# Patient Record
Sex: FEMALE | Race: ASIAN | NOT HISPANIC OR LATINO | Employment: FULL TIME | URBAN - METROPOLITAN AREA
[De-identification: names, ages, dates, MRNs, and addresses within clinical notes are randomized per-mention and may not be internally consistent; named-entity substitution may affect disease eponyms.]

---

## 2023-02-01 ENCOUNTER — OFFICE VISIT (OUTPATIENT)
Dept: INTERNAL MEDICINE | Facility: CLINIC | Age: 33
End: 2023-02-01
Payer: COMMERCIAL

## 2023-02-01 VITALS
DIASTOLIC BLOOD PRESSURE: 67 MMHG | TEMPERATURE: 97.3 F | HEART RATE: 82 BPM | SYSTOLIC BLOOD PRESSURE: 110 MMHG | HEIGHT: 64 IN | OXYGEN SATURATION: 98 % | RESPIRATION RATE: 16 BRPM | WEIGHT: 188.7 LBS | BODY MASS INDEX: 32.21 KG/M2

## 2023-02-01 DIAGNOSIS — Z91.89 BREASTFEEDING PROBLEM: ICD-10-CM

## 2023-02-01 DIAGNOSIS — R22.32 AXILLARY LUMP, LEFT: Primary | ICD-10-CM

## 2023-02-01 PROCEDURE — 99203 OFFICE O/P NEW LOW 30 MIN: CPT | Performed by: INTERNAL MEDICINE

## 2023-02-01 RX ORDER — CEPHALEXIN 500 MG/1
500 CAPSULE ORAL 3 TIMES DAILY
Qty: 21 CAPSULE | Refills: 0 | Status: SHIPPED | OUTPATIENT
Start: 2023-02-01 | End: 2023-12-26

## 2023-02-01 NOTE — PROGRESS NOTES
"  Assessment & Plan     Axillary lump, left    - cephALEXin (KEFLEX) 500 MG capsule; Take 1 capsule (500 mg) by mouth 3 times daily    Breastfeeding problem    31 yo female, had her baby boy in July 2022, and was breastfeeding up to 2 weeks ago. She slowly decreased the frequency of pumping and now has very small milk production mostly from the left breast. No breast fullness, tension, redness or pain. She noticed tender painful lump in the left breast for about 10 days. No redness, drainage. She tried massage and warm compressed, but actually thinks that the lump is getting bigger and more tender. It is the size of the egg now. She does shave her armpit.  She donated the kidney in the past. Cr normal 0.74 in June 2022.    Home treatment discussed. She will take Cephalexin for 1 week. Follow-up and possible US if no improvement in 2 weeks.               BMI:   Estimated body mass index is 32.9 kg/m  as calculated from the following:    Height as of this encounter: 1.613 m (5' 3.5\").    Weight as of this encounter: 85.6 kg (188 lb 11.2 oz).           Return in about 2 weeks (around 2/15/2023) for Follow up.    Cookie Urbina MD  [facility]    Verona Ureña is a 32 year old, presenting for the following health issues:  Pain (Having Armpit pain since stopping breastfeeding x.5 weeks Has developed a lump in L Armpit pain has worsened )      Pain    History of Present Illness       Reason for visit:  Under arm pain and lump present  Symptom onset:  1-2 weeks ago    She eats 2-3 servings of fruits and vegetables daily.She consumes 1 sweetened beverage(s) daily.She exercises with enough effort to increase her heart rate 10 to 19 minutes per day.  She exercises with enough effort to increase her heart rate 3 or less days per week.   She is taking medications regularly.             Review of Systems         Objective    /67   Pulse 82   Temp 97.3  F (36.3  C)   Resp 16   Ht " "1.613 m (5' 3.5\")   Wt 85.6 kg (188 lb 11.2 oz)   SpO2 98%   BMI 32.90 kg/m    Body mass index is 32.9 kg/m .  Physical Exam   Constitutional:  oriented to person, place, and time, appears well-nourished. No distress.   HENT:   Head: Normocephalic.   Eyes: Conjunctivae are normal.  Neck: Normal range of motion.   Pulmonary/Chest: Effort normal   Breast exam: no palpable masses and asymmetry, no redness or sign of mastitis. Palpable tender, egg size lump in the left axilla, no redness, warmness, drainage.  Musculoskeletal: Normal range of motion.   Neurological: alert and oriented to person, place, and time.   Skin: Skin is warm.   Psychiatric: normal mood and affect.                    "

## 2023-02-01 NOTE — PATIENT INSTRUCTIONS
You can try cool compresses, hot showers, gentle massage.    Rx for cephalexin is sent.    If not sreekanth in 7-10 days, we can do the US.

## 2023-02-02 ENCOUNTER — MEDICAL CORRESPONDENCE (OUTPATIENT)
Dept: HEALTH INFORMATION MANAGEMENT | Facility: CLINIC | Age: 33
End: 2023-02-02

## 2023-02-05 ENCOUNTER — HEALTH MAINTENANCE LETTER (OUTPATIENT)
Age: 33
End: 2023-02-05

## 2023-12-26 ENCOUNTER — OFFICE VISIT (OUTPATIENT)
Dept: INTERNAL MEDICINE | Facility: CLINIC | Age: 33
End: 2023-12-26
Payer: COMMERCIAL

## 2023-12-26 VITALS
BODY MASS INDEX: 33.03 KG/M2 | RESPIRATION RATE: 16 BRPM | TEMPERATURE: 97.1 F | HEIGHT: 63 IN | WEIGHT: 186.4 LBS | SYSTOLIC BLOOD PRESSURE: 116 MMHG | HEART RATE: 95 BPM | DIASTOLIC BLOOD PRESSURE: 68 MMHG | OXYGEN SATURATION: 98 %

## 2023-12-26 DIAGNOSIS — Z00.00 ANNUAL PHYSICAL EXAM: Primary | ICD-10-CM

## 2023-12-26 PROCEDURE — 86580 TB INTRADERMAL TEST: CPT | Performed by: INTERNAL MEDICINE

## 2023-12-26 PROCEDURE — 99395 PREV VISIT EST AGE 18-39: CPT | Performed by: INTERNAL MEDICINE

## 2023-12-26 ASSESSMENT — ENCOUNTER SYMPTOMS
ABDOMINAL PAIN: 0
ARTHRALGIAS: 0
WEAKNESS: 0
CONSTIPATION: 0
HEARTBURN: 0
FREQUENCY: 0
PALPITATIONS: 0
NERVOUS/ANXIOUS: 0
DIZZINESS: 0
HEMATURIA: 0
NAUSEA: 0
SHORTNESS OF BREATH: 0
CHILLS: 0
HEADACHES: 0
HEMATOCHEZIA: 0
COUGH: 0
PARESTHESIAS: 0
DYSURIA: 0
EYE PAIN: 0
DIARRHEA: 0
MYALGIAS: 0
SORE THROAT: 0
JOINT SWELLING: 0
FEVER: 0

## 2023-12-26 NOTE — PROGRESS NOTES
SUBJECTIVE:   Adelita is a 33 year old, presenting for the following:  Physical (Pap 22-Need's TB Screen )        2023    12:08 PM   Additional Questions   Roomed by Joseline       [email or web address]                Have you ever done Advance Care Planning? (For example, a Health Directive, POLST, or a discussion with a medical provider or your loved ones about your wishes): No, advance care planning information given to patient to review.  Patient plans to discuss their wishes with loved ones or provider.      Social History     Tobacco Use    Smoking status: Never     Passive exposure: Never    Smokeless tobacco: Never   Substance Use Topics    Alcohol use: Not on file             2023    12:06 PM   Alcohol Use   Prescreen: >3 drinks/day or >7 drinks/week? No     Reviewed orders with patient.  Reviewed health maintenance and updated orders accordingly - Yes      Breast Cancer Screenin/26/2023    12:07 PM   Breast CA Risk Assessment (FHS-7)   Do you have a family history of breast, colon, or ovarian cancer? No / Unknown           Pertinent mammograms are reviewed under the imaging tab.    History of abnormal Pap smear:      Reviewed and updated as needed this visit by clinical staff   Tobacco  Allergies  Meds              Reviewed and updated as needed this visit by Provider                       CONSTITUTIONAL: NEGATIVE for fever, chills, change in weight  INTEGUMENTARU/SKIN: NEGATIVE for worrisome rashes, moles or lesions  EYES: NEGATIVE for vision changes or irritation  ENT: NEGATIVE for ear, mouth and throat problems  RESP: NEGATIVE for significant cough or SOB  BREAST: NEGATIVE for masses, tenderness or discharge  CV: NEGATIVE for chest pain, palpitations or peripheral edema  GI: NEGATIVE for nausea, abdominal pain, heartburn, or change in bowel habits  : NEGATIVE for unusual urinary or vaginal symptoms. Periods are regular.  MUSCULOSKELETAL: NEGATIVE for significant arthralgias or  "myalgia  NEURO: NEGATIVE for weakness, dizziness or paresthesias  PSYCHIATRIC: NEGATIVE for changes in mood or affect     OBJECTIVE:   /68   Pulse 95   Temp 97.1  F (36.2  C)   Resp 16   Ht 1.599 m (5' 2.95\")   Wt 84.6 kg (186 lb 6.4 oz)   LMP 12/01/2023   SpO2 98%   BMI 33.07 kg/m      General Appearance: Alert, cooperative, no distress, appears stated age.  Head: Normocephalic, without obvious abnormality, atraumatic  Eyes: PERRL, conjunctiva/corneas clear, EOM's intact  Ears: Normal TM's and external ear canals, both ears  Nose: Nares normal, septum midline,mucosa normal, no drainage  Throat: Lips, mucosa, and tongue normal; teeth and gums normal  Neck: Supple, symmetrical, trachea midline, no adenopathy;  thyroid: not enlarged, symmetric, no tenderness/mass/nodules; no carotid bruit or JVD  Back: Symmetric, no curvature, ROM normal, no CVA tenderness.  Lungs: Clear to auscultation bilaterally, respirations unlabored.  Breasts: No breast masses, tenderness, asymmetry, or nipple discharge.  Heart: Regular rate and rhythm, S1 and S2 normal, no murmur, rub, or gallop.  Abdomen: Soft, non-tender, bowel sounds active all four quadrants,  no masses, no organomegaly.  Extremities: Extremities normal, atraumatic, no cyanosis or edema.  Skin: Skin color, texture, turgor normal, no rashes or lesions.  Lymph nodes: Cervical, supraclavicular, and axillary nodes normal.  Neurologic: No focal neurological findings.          ASSESSMENT/PLAN:   (Z00.00) Annual physical exam  (primary encounter diagnosis)  [facility] and needs 2 step Mantoux TB test.   She donated kidney to her father and has regular blood work at [facility] every year.          COUNSELING:  Reviewed preventive health counseling, as reflected in patient instructions       Regular exercise       Healthy diet/nutrition      BMI:   Estimated body mass index is 33.07 kg/m  as calculated from the following:    Height as " "of this encounter: 1.599 m (5' 2.95\").    Weight as of this encounter: 84.6 kg (186 lb 6.4 oz).         She reports that she has never smoked. She has never been exposed to tobacco smoke. She has never used smokeless tobacco.          Cookie Urbina MD    "

## 2023-12-28 ENCOUNTER — ALLIED HEALTH/NURSE VISIT (OUTPATIENT)
Dept: FAMILY MEDICINE | Facility: CLINIC | Age: 33
End: 2023-12-28
Payer: COMMERCIAL

## 2023-12-28 DIAGNOSIS — Z11.1 TUBERCULIN SKIN TEST READING ENCOUNTER: Primary | ICD-10-CM

## 2023-12-28 LAB
PPDINDURATION: 0 MM (ref 0–4.99)
PPDREDNESS: NORMAL

## 2023-12-28 PROCEDURE — 99207 PR NO CHARGE NURSE ONLY: CPT

## 2023-12-28 NOTE — PROGRESS NOTES
Patient is here today for a Mantoux (TST) test results.    Did patient return to clinic 48-72 hours from Mantoux (TST) placement: Yes -     PPD Induration   Date Value Ref Range Status   12/28/2023 0 0 - 4.99 mm Final     PPD Redness   Date Value Ref Range Status   12/28/2023 Not Present  Final       Induration Size? Induration <5mm - Enter results in Enter/Edit Activity. Route results to ordering provider.     Patient needs form signed? No    Patient reports having previously had the BCG Vaccine: No    Does patient need a two step?  Yes - placed order according to standing order or notified LP for need for next TST.  Instructed patient when to return to the clinic.

## 2024-01-02 ENCOUNTER — ALLIED HEALTH/NURSE VISIT (OUTPATIENT)
Dept: FAMILY MEDICINE | Facility: CLINIC | Age: 34
End: 2024-01-02
Payer: COMMERCIAL

## 2024-01-02 DIAGNOSIS — Z00.00 HEALTH CARE MAINTENANCE: Primary | ICD-10-CM

## 2024-01-02 PROCEDURE — 86580 TB INTRADERMAL TEST: CPT

## 2024-01-02 PROCEDURE — 99207 PR NO CHARGE NURSE ONLY: CPT

## 2024-01-02 NOTE — PROGRESS NOTES
Clinic Administered Medication Documentation      Injectable Medication Documentation    Is there an active order (written within the past 365 days, with administrations remaining, not ) in the chart? Yes.     Patient was given  mantoux . Prior to medication administration, verified patient's identity using patient s name and date of birth. Please see MAR and medication order for additional information. Patient instructed to remain in clinic for 15 minutes and report any adverse reaction to staff immediately.    Vial/Syringe: Multi dose vial  Was this medication supplied by the patient? No  Is this a medication the patient will need to receive again? No - not necessary to check for refills remaining.

## 2024-01-05 ENCOUNTER — ALLIED HEALTH/NURSE VISIT (OUTPATIENT)
Dept: FAMILY MEDICINE | Facility: CLINIC | Age: 34
End: 2024-01-05
Payer: COMMERCIAL

## 2024-01-05 DIAGNOSIS — Z11.1 TUBERCULIN SKIN TEST READING ENCOUNTER: Primary | ICD-10-CM

## 2024-01-05 LAB
PPDINDURATION: 0 MM (ref 0–4.99)
PPDREDNESS: NORMAL

## 2024-01-05 PROCEDURE — 99207 PR NO CHARGE NURSE ONLY: CPT

## 2024-01-05 NOTE — PROGRESS NOTES
Patient is here today for a Mantoux (TST) test results.    Did patient return to clinic 48-72 hours from Mantoux (TST) placement: Yes - placed on 1/2/24 at 2 PM. Seen today at 1/5/24 at 10 AM.     PPD Induration   Date Value Ref Range Status   01/05/2024 0 0 - 4.99 mm Final     PPD Redness   Date Value Ref Range Status   01/05/2024 Not Present  Final       Induration Size? Induration <5mm - Enter results in Enter/Edit Activity. Route results to ordering provider.     Patient needs form signed? No    Patient reports having previously had the BCG Vaccine: No    Does patient need a two step? No This is step two for patients.     Harry KRAMER RN

## 2024-01-06 ENCOUNTER — OFFICE VISIT (OUTPATIENT)
Dept: FAMILY MEDICINE | Facility: CLINIC | Age: 34
End: 2024-01-06
Payer: OTHER MISCELLANEOUS

## 2024-01-06 VITALS
TEMPERATURE: 98.3 F | SYSTOLIC BLOOD PRESSURE: 116 MMHG | DIASTOLIC BLOOD PRESSURE: 76 MMHG | RESPIRATION RATE: 14 BRPM | HEART RATE: 79 BPM | OXYGEN SATURATION: 99 %

## 2024-01-06 DIAGNOSIS — M25.562 ACUTE PAIN OF LEFT KNEE: Primary | ICD-10-CM

## 2024-01-06 DIAGNOSIS — N92.6 LATE MENSES: ICD-10-CM

## 2024-01-06 LAB — HCG UR QL: POSITIVE

## 2024-01-06 PROCEDURE — 81025 URINE PREGNANCY TEST: CPT | Performed by: FAMILY MEDICINE

## 2024-01-06 PROCEDURE — 99213 OFFICE O/P EST LOW 20 MIN: CPT | Performed by: FAMILY MEDICINE

## 2024-01-06 NOTE — PROGRESS NOTES
Assessment & Plan     Acute pain of left knee    - Ankle/Knee Bracing Supplies Order Knee Immobilizer; Left    Placed in LEFT knee immobilizer.  With +UPT- we opted to hold on xraying knee as patient can weight bear and ambulate at this time.  Suspect this is a ligament strain from the fall and shoud improve with time 1-2 weeks.  Continue with ICE and Tylenol and elevation prn.  If no change or worsening sx- recommend Follow-up in one week for recheck prn.  May return to work without restrictions- patient stated she did not need work note today.    Late menses    - HCG qualitative urine; Future  - HCG qualitative urine    +UPT today- advised Follow-up with OB to establish prenatal care.    Lore De La Cruz MD  [facility]    Verona Ureña is a 33 year old, presenting for the following health issues:  No chief complaint on file.      HPI     Was working at [employer] today and slipped on wet floor after floors were mopped and anti-slip mats were removed.  Fell back onto butt but heard LEFT knee pop.  Able to bear weight immediately after.    Late menses due at beginning of month- trying to get pregnant.            Review of Systems   Constitutional, HEENT, cardiovascular, pulmonary, GI, , musculoskeletal, neuro, skin, endocrine and psych systems are negative, except as otherwise noted.      Objective    LMP 12/01/2023   There is no height or weight on file to calculate BMI.  Physical Exam   GENERAL: healthy, alert and no distress  EYES: Eyes grossly normal to inspection, PERRL and conjunctivae and sclerae normal  MS: no gross musculoskeletal defects noted, LEFT knee with mild swelling anteriorly over anterior knee- able to flex and extend with pain located posterolaterally.  No pain along joint line.  Normal landmarks.  PSYCH: mentation appears normal, affect normal/bright    Results for orders placed or performed in visit on 01/06/24 (from the past 24 hour(s))   HCG  qualitative urine   Result Value Ref Range    hCG Urine Qualitative Positive (A) Negative

## 2024-01-08 ENCOUNTER — TELEPHONE (OUTPATIENT)
Dept: FAMILY MEDICINE | Facility: CLINIC | Age: 34
End: 2024-01-08

## 2024-01-08 ENCOUNTER — LAB (OUTPATIENT)
Dept: LAB | Facility: CLINIC | Age: 34
End: 2024-01-08
Payer: COMMERCIAL

## 2024-01-08 ENCOUNTER — MYC MEDICAL ADVICE (OUTPATIENT)
Dept: INTERNAL MEDICINE | Facility: CLINIC | Age: 34
End: 2024-01-08

## 2024-01-08 ENCOUNTER — TRANSCRIBE ORDERS (OUTPATIENT)
Dept: ULTRASOUND IMAGING | Facility: CLINIC | Age: 34
End: 2024-01-08

## 2024-01-08 ENCOUNTER — TELEPHONE (OUTPATIENT)
Dept: OBGYN | Facility: CLINIC | Age: 34
End: 2024-01-08
Payer: COMMERCIAL

## 2024-01-08 DIAGNOSIS — Z87.59 HISTORY OF ECTOPIC PREGNANCY: ICD-10-CM

## 2024-01-08 DIAGNOSIS — O26.90 PREGNANCY RELATED CONDITION, ANTEPARTUM: Primary | ICD-10-CM

## 2024-01-08 DIAGNOSIS — Z87.59 HISTORY OF ECTOPIC PREGNANCY: Primary | ICD-10-CM

## 2024-01-08 LAB — HCG INTACT+B SERPL-ACNC: 260 MIU/ML

## 2024-01-08 PROCEDURE — 36415 COLL VENOUS BLD VENIPUNCTURE: CPT

## 2024-01-08 PROCEDURE — 84702 CHORIONIC GONADOTROPIN TEST: CPT

## 2024-01-08 NOTE — TELEPHONE ENCOUNTER
[facility] Call Center    Phone Message    May a detailed message be left on voicemail: yes     Reason for Call: Other: Pt called this morning because she had a positive pregnancy test over the weekend and scheduled a initial prenatal appointment with Dr. Gonzalez. Patient states with her first pregnancy it was ectopic and she had an ultrasound at 6/7 weeks GA. Patient is wondering if that can be the same with this pregnancy. Please call Pt to discuss      Action Taken: Other: [facility] Family [facility]    Travel Screening: Not Applicable

## 2024-01-08 NOTE — TELEPHONE ENCOUNTER
[facility] Call Center    Phone Message    May a detailed message be left on voicemail: yes     Reason for Call: Other: Patient is calling stating she had an eptopic pregnancy in the past and with her last pregnancy she had an ultra sound done at  7 weeks and wondering if she can get that done early again with this pregnancy as well as some blood work? Please call the patient back to discuss. Thank you.      Action Taken: Other: obgyn    Travel Screening: Not Applicable         Pt new to clinic, per pt, hx of ectopic  Wondering if can do early ultrasound at 7 weeks (or unsure if want to do before to assess location?)  Also wants HCGs  Labs/ultrasound pended  Will be seeing Dr. Arce for initial OB  Routing to provider to advise.  Laurel Rasheed RN on 1/8/2024 at 9:13 AM

## 2024-01-09 PROBLEM — O36.80X0 PREGNANCY OF UNKNOWN ANATOMIC LOCATION: Status: ACTIVE | Noted: 2024-01-09

## 2024-01-09 NOTE — TELEPHONE ENCOUNTER
Yes we will order ultrasound to confirm the pregnancy around 6-7wk of GA  Looks like patient also made onel with OB and canceled my onel ??    Yessica Gonzalez MD

## 2024-01-10 ENCOUNTER — LAB (OUTPATIENT)
Dept: LAB | Facility: CLINIC | Age: 34
End: 2024-01-10
Payer: COMMERCIAL

## 2024-01-10 DIAGNOSIS — Z87.59 HISTORY OF ECTOPIC PREGNANCY: ICD-10-CM

## 2024-01-10 LAB — HCG INTACT+B SERPL-ACNC: 558 MIU/ML

## 2024-01-10 PROCEDURE — 36415 COLL VENOUS BLD VENIPUNCTURE: CPT

## 2024-01-10 PROCEDURE — 84702 CHORIONIC GONADOTROPIN TEST: CPT

## 2024-01-15 ENCOUNTER — ANCILLARY PROCEDURE (OUTPATIENT)
Dept: ULTRASOUND IMAGING | Facility: CLINIC | Age: 34
End: 2024-01-15
Payer: COMMERCIAL

## 2024-01-15 DIAGNOSIS — Z87.59 HISTORY OF ECTOPIC PREGNANCY: ICD-10-CM

## 2024-01-15 PROCEDURE — 76801 OB US < 14 WKS SINGLE FETUS: CPT | Performed by: OBSTETRICS & GYNECOLOGY

## 2024-01-15 PROCEDURE — 76817 TRANSVAGINAL US OBSTETRIC: CPT | Performed by: OBSTETRICS & GYNECOLOGY

## 2024-01-16 ENCOUNTER — VIRTUAL VISIT (OUTPATIENT)
Dept: OBGYN | Facility: CLINIC | Age: 34
End: 2024-01-16
Payer: COMMERCIAL

## 2024-01-16 ENCOUNTER — OFFICE VISIT (OUTPATIENT)
Dept: FAMILY MEDICINE | Facility: CLINIC | Age: 34
End: 2024-01-16
Payer: OTHER MISCELLANEOUS

## 2024-01-16 VITALS
DIASTOLIC BLOOD PRESSURE: 70 MMHG | WEIGHT: 191 LBS | OXYGEN SATURATION: 98 % | BODY MASS INDEX: 33.89 KG/M2 | HEART RATE: 93 BPM | SYSTOLIC BLOOD PRESSURE: 100 MMHG

## 2024-01-16 DIAGNOSIS — O36.80X0 PREGNANCY OF UNKNOWN ANATOMIC LOCATION: Primary | ICD-10-CM

## 2024-01-16 DIAGNOSIS — M25.562 ACUTE PAIN OF LEFT KNEE: Primary | ICD-10-CM

## 2024-01-16 DIAGNOSIS — Z02.6 ENCOUNTER RELATED TO WORKER'S COMPENSATION CLAIM: ICD-10-CM

## 2024-01-16 PROCEDURE — 99441 PR PHYSICIAN TELEPHONE EVALUATION 5-10 MIN: CPT | Mod: 93 | Performed by: OBSTETRICS & GYNECOLOGY

## 2024-01-16 PROCEDURE — 99213 OFFICE O/P EST LOW 20 MIN: CPT | Performed by: PHYSICIAN ASSISTANT

## 2024-01-16 NOTE — PROGRESS NOTES
Assessment & Plan     Acute pain of left knee  Work comp injury 1/6/24  Improving, continues to have pain with ambulation worse with going down stairs and squatting  Will refer to physical therapy  Workability completed today  - Physical Therapy Referral    Encounter related to worker's compensation claim          MACARIO [facility] LON Ureña is a 33 year old, presenting for the following health issues:  Follow Up, Establish Care, Work Comp (Paperwork ), and Patient/info Update (Pt is pregnant 5 weeks)        1/16/2024     3:22 PM   Additional Questions   Roomed by Lupe   Accompanied by        History of Present Illness       Reason for visit:  Work comp    She eats 2-3 servings of fruits and vegetables daily.She consumes 1 sweetened beverage(s) daily.She exercises with enough effort to increase her heart rate 20 to 29 minutes per day.  She exercises with enough effort to increase her heart rate 3 or less days per week.   She is taking medications regularly.       Work comp injury 1/6/2024  Employer: [employer]    Pt slipped on wet floor at work landing on left knee and had immediate pain.  She was seen at urgent care, no xray was done due to positive pregnancy test.  She wore a knee brace for 1 week, symptoms are improving.  She was able to return to work 1/10/2024.  Today reports knee feels sore with ambulating but she is able to walk.  Pain is worse with going down steps.  She has iced and done massage.  Has pain with squatting.      Review of Systems   Constitutional, HEENT, cardiovascular, pulmonary, gi and gu systems are negative, except as otherwise noted.      Objective    Wt 86.6 kg (191 lb)   LMP 12/09/2023 (Within Days)   BMI 33.89 kg/m    Body mass index is 33.89 kg/m .  Physical Exam   GENERAL: healthy, alert and no distress  RESP: lungs clear to auscultation - no rales, rhonchi or wheezes  CV: regular rate and rhythm, normal S1 S2,  no S3 or S4, no murmur, click or rub, no peripheral edema and peripheral pulses strong  MS: left knee nttp, + pain with flexion, nl varus/valgus stress.

## 2024-01-16 NOTE — PROGRESS NOTES
"Adelita is a 33 year old who is being evaluated via a billable telephone visit.      What phone number would you like to be contacted at? [phone]    How would you like to obtain your AVS? Beth    Distant Location (provider location):  On-site  Patient location: work    Assessment & Plan     Pregnancy of unknown anatomic location  Reviewed ultrasound results and images. Reviewed HCGs.   Discussed DDx: early pregnancy, early miscarriage, ectopic pregnancy with pseudosac.  Recommended close follow-up. Continue to track HCG quant, if rising normally then ultrasound in one week.   Unsure LMP  - hCG Quantitative Pregnancy; Standing  - US OB Transvaginal Only; Future    Review of the result(s) of each unique test - labs, ultrasound   Ordering of each unique test  12 minutes spent by me on the date of the encounter doing chart review, history and exam, documentation and further activities per the note       BMI:   Estimated body mass index is 33.07 kg/m  as calculated from the following:    Height as of 12/26/23: 1.599 m (5' 2.95\").    Weight as of 12/26/23: 84.6 kg (186 lb 6.4 oz).   Weight management plan: Discussed healthy diet and exercise guidelines        No follow-ups on file.    Yana Zaldivar MD  [facility]    Subjective   Adelita is a 33 year old, presenting for the following health issues:  Follow Up      HPI   Previous ectopic pregnancy  Diagnosed after bloody vaginal discharge that progressed to bright red bleeding.   HCG was going down, so thought it was a miscarriage  Ultrasound showed ectopic and got methotrexate treatment successfully.  Then had successful pregnancy with her son.     Regular periods prior to pregnancy, but cycles had lengthened a little.   Unsure LMP, wasn't really keeping track.     Currently feeling ok. No pain. No bleeding.      No past medical history on file.    No past surgical history on file.    No family history on file.    Social History "     Socioeconomic History    Marital status:      Spouse name: Not on file    Number of children: Not on file    Years of education: Not on file    Highest education level: Not on file   Occupational History    Not on file   Tobacco Use    Smoking status: Never     Passive exposure: Never    Smokeless tobacco: Never   Vaping Use    Vaping Use: Never used   Substance and Sexual Activity    Alcohol use: Not on file    Drug use: Not on file    Sexual activity: Not on file   Other Topics Concern    Not on file   Social History Narrative    Not on file     Social Determinants of Health     Financial Resource Strain: Low Risk  (12/26/2023)    Financial Resource Strain     Within the past 12 months, have you or your family members you live with been unable to get utilities (heat, electricity) when it was really needed?: No   Food Insecurity: Low Risk  (12/26/2023)    Food Insecurity     Within the past 12 months, did you worry that your food would run out before you got money to buy more?: No     Within the past 12 months, did the food you bought just not last and you didn t have money to get more?: No   Transportation Needs: Low Risk  (12/26/2023)    Transportation Needs     Within the past 12 months, has lack of transportation kept you from medical appointments, getting your medicines, non-medical meetings or appointments, work, or from getting things that you need?: No   Physical Activity: Not on file   Stress: Not on file   Social Connections: Not on file   Interpersonal Safety: Low Risk  (12/26/2023)    Interpersonal Safety     Do you feel physically and emotionally safe where you currently live?: Yes     Within the past 12 months, have you been hit, slapped, kicked or otherwise physically hurt by someone?: No     Within the past 12 months, have you been humiliated or emotionally abused in other ways by your partner or ex-partner?: No   Housing Stability: Low Risk  (12/26/2023)    Housing Stability     Do you  have housing? : Yes     Are you worried about losing your housing?: No       Current Outpatient Medications   Medication    Ferrous Sulfate (IRON PO)     No current facility-administered medications for this visit.          Allergies   Allergen Reactions    Clindamycin Rash           Review of Systems   Constitutional, HEENT, cardiovascular, pulmonary, gi and gu systems are negative, except as otherwise noted.      Objective           Vitals:  No vitals were obtained today due to virtual visit.    Physical Exam   healthy and alert  PSYCH: Alert and oriented times 3; coherent speech, normal   rate and volume, able to articulate logical thoughts, able   to abstract reason, no tangential thoughts, no hallucinations   or delusions  Her affect is normal  RESP: No cough, no audible wheezing, able to talk in full sentences  Remainder of exam unable to be completed due to telephone visits    US OB <14 Weeks w Transvaginal Single  Order #: [id number] Accession #: [id number]  Study Notes      Hansa Fraser on 1/15/2024  9:29 AM      Obstetrical Ultrasound Report  OB U/S  < 14 Weeks -  Transabdominal and Transvaginal  [facility]  Referring Provider: Yana Zaldivar MD  Sonographer: Hansa Fraser RDMS  Indication:  Viability check  and Size and Dates     Dating (mm/dd/yyyy):   LMP: 12/01/2023            EDC:  09/06/2024            GA by LMP:         6w3d     Current Scan On:  1/15/2024          GA by Current Scan:        5w2d based off of MSD  The calculation of the gestational age by current scan was based on CRL.  Anatomy Scan:  Alexander gestation.  Biometry:  CRL                       NV            Yolk Sac                              NV   GS                      0.63 cm                 5w2d          Fetal heart activity:  Rate and rhythm is within normal limits.  Fetal heart rate:  bpm  Findings:      Maternal Structures:  Cervix: The cervix appears long and closed.  Right Ovary: Wnl  Left Ovary:  Wnl  Technique:Transvaginal Imaging performed  Transabdominal Imaging performed  Impression:      Small gestational sac seen, measures 5w2d on today's ultrasound.  No fetal pole or yolk sac seen. Will need repeat ultrasound in 2 weeks for viability.       TORRES LOPEZ MD             HCG Qual Urine hCG Quantitative   Latest Ref Rng Negative  <5 mIU/mL   1/8/2024  11:11 AM  260 (H)    1/10/2024  11:26 AM  558 (H)       Legend:  (H) High    Phone call duration: 9 minutes

## 2024-01-18 ENCOUNTER — LAB (OUTPATIENT)
Dept: LAB | Facility: CLINIC | Age: 34
End: 2024-01-18
Payer: COMMERCIAL

## 2024-01-18 ENCOUNTER — TELEPHONE (OUTPATIENT)
Dept: OBGYN | Facility: CLINIC | Age: 34
End: 2024-01-18
Payer: COMMERCIAL

## 2024-01-18 DIAGNOSIS — O36.80X0 PREGNANCY OF UNKNOWN ANATOMIC LOCATION: ICD-10-CM

## 2024-01-18 LAB — HCG INTACT+B SERPL-ACNC: ABNORMAL MIU/ML

## 2024-01-18 PROCEDURE — 36415 COLL VENOUS BLD VENIPUNCTURE: CPT

## 2024-01-18 PROCEDURE — 84702 CHORIONIC GONADOTROPIN TEST: CPT

## 2024-01-18 NOTE — TELEPHONE ENCOUNTER
RN called patient, no answer, LVMTCB. Also sent [employer] message.     Wanting to get patient in for HCGs before US next week. Ideally today and Saturday.     DANNI Krause

## 2024-01-20 ENCOUNTER — LAB (OUTPATIENT)
Dept: LAB | Facility: CLINIC | Age: 34
End: 2024-01-20
Payer: COMMERCIAL

## 2024-01-20 DIAGNOSIS — O36.80X0 PREGNANCY OF UNKNOWN ANATOMIC LOCATION: ICD-10-CM

## 2024-01-20 LAB — HCG INTACT+B SERPL-ACNC: ABNORMAL MIU/ML

## 2024-01-20 PROCEDURE — 36415 COLL VENOUS BLD VENIPUNCTURE: CPT

## 2024-01-20 PROCEDURE — 84702 CHORIONIC GONADOTROPIN TEST: CPT

## 2024-01-23 ENCOUNTER — HOSPITAL ENCOUNTER (OUTPATIENT)
Dept: ULTRASOUND IMAGING | Facility: CLINIC | Age: 34
Discharge: HOME | End: 2024-01-23
Attending: OBSTETRICS & GYNECOLOGY | Admitting: OBSTETRICS & GYNECOLOGY
Payer: COMMERCIAL

## 2024-01-23 ENCOUNTER — PRENATAL OFFICE VISIT (OUTPATIENT)
Dept: OBGYN | Facility: CLINIC | Age: 34
End: 2024-01-23
Payer: COMMERCIAL

## 2024-01-23 VITALS — BODY MASS INDEX: 33.83 KG/M2 | HEIGHT: 63 IN

## 2024-01-23 DIAGNOSIS — O36.80X0 PREGNANCY OF UNKNOWN ANATOMIC LOCATION: ICD-10-CM

## 2024-01-23 DIAGNOSIS — Z34.90 ENCOUNTER FOR SUPERVISION OF NORMAL PREGNANCY: ICD-10-CM

## 2024-01-23 DIAGNOSIS — Z92.89 HISTORY OF BLOOD TRANSFUSION: ICD-10-CM

## 2024-01-23 DIAGNOSIS — Z23 NEED FOR TDAP VACCINATION: ICD-10-CM

## 2024-01-23 DIAGNOSIS — Z87.59 HISTORY OF ECTOPIC PREGNANCY: Primary | ICD-10-CM

## 2024-01-23 DIAGNOSIS — Z52.4 KIDNEY DONOR: ICD-10-CM

## 2024-01-23 PROCEDURE — 99207 PR NO CHARGE NURSE ONLY: CPT

## 2024-01-23 PROCEDURE — 76801 OB US < 14 WKS SINGLE FETUS: CPT | Mod: 26 | Performed by: RADIOLOGY

## 2024-01-23 PROCEDURE — 76817 TRANSVAGINAL US OBSTETRIC: CPT | Mod: 26 | Performed by: RADIOLOGY

## 2024-01-23 PROCEDURE — 76801 OB US < 14 WKS SINGLE FETUS: CPT

## 2024-01-23 RX ORDER — OMEGA-3/DHA/EPA/FISH OIL 60 MG-90MG
CAPSULE ORAL
COMMUNITY

## 2024-01-23 RX ORDER — MULTIVIT-MIN/IRON/FOLIC ACID/K 18-600-40
CAPSULE ORAL
COMMUNITY

## 2024-01-23 NOTE — PROGRESS NOTES
Important Information for Provider:     New ob nurse intake by phone, third pregnancy. Recent delivery 7/31/2022, abnormal 1 hour GCT, passed 3 hour GTT.  History of ectopic 7/2021  Ultrasound was performed 1/15/2024, no fetal pole or yolk sac seen. Repeating ultrasound today with Dr Russo to all with results 1/24/2024  Patient has scheduled genetic screening with MFM 2/21/2024. NOB with Dr Arce 2/20/2024  Handouts reviewed. Recommended B6, Unisom for nausea  A1c ordered with NOB labs. Patient was a kidney donor 2016, received blood transfusion        Caffeine intake/servings daily - 0  Calcium intake/servings daily - 3  Exercise 5 times weekly - describe ; walks daily., precautions given   Sunscreen used - Yes  Seatbelts used - Yes  Guns stored in the home - No  Self Breast Exam - Yes  Pap test up to date -  Yes  Dental exam up to date -  Yes  Immunizations reviewed and up to date - Yes  Abuse: Current or Past (Physical, Sexual or Emotional) - No  Do you feel safe in your environment - Yes  Do you cope well with stress - Yes        Prenatal OB Questionnaire  Patient supplied answers from flow sheet for:  Prenatal OB Questionnaire.  Past Medical History  Have you ever received care for your mental health? : No  Have you ever been in a major accident or suffered serious trauma?: No  Within the last year, has anyone hit, slapped, kicked or otherwise hurt you?: No  In the last year, has anyone forced you to have sex when you didn't want to?: No    Past Medical History 2   Have you ever received a blood transfusion?: (!) Yes  2016, kidney donor  Would you accept a blood transfusion if was medically recommended?: Yes  Does anyone in your home smoke?: No   Is your blood type Rh negative?: Unknown  Have you ever ?: (!) Yes  Have you been hospitalized for a nonsurgical reason excluding normal delivery?: (!) Yes  Have you ever had an abnormal pap smear?: No    Past Medical History (Continued)  Do you have a  history of abnormalities of the uterus?: No  Did your mother take SUNDAR or any other hormones when she was pregnant with you?: Unknown  Do you have any other problems we have not asked about which you feel may be important to this pregnancy?: No                     Allergies as of 1/23/2024:    Allergies as of 01/23/2024 - Reviewed 01/23/2024   Allergen Reaction Noted    Clindamycin Rash 05/19/2014       Current medications are:  Current Outpatient Medications   Medication Sig Dispense Refill    Prenatal Vit-DSS-Fe Cbn-FA (PRENATAL AD PO)            Early ultrasound screening tool:    Does patient have irregular periods?  No  Did patient use hormonal birth control in the three months prior to positive urine pregnancy test? No  Is the patient breastfeeding?  No  Is the patient 10 weeks or greater at time of education visit?  No

## 2024-01-24 ENCOUNTER — VIRTUAL VISIT (OUTPATIENT)
Dept: OBGYN | Facility: CLINIC | Age: 34
End: 2024-01-24
Payer: COMMERCIAL

## 2024-01-24 DIAGNOSIS — K64.4 EXTERNAL HEMORRHOIDS: ICD-10-CM

## 2024-01-24 DIAGNOSIS — E55.9 VITAMIN D DEFICIENCY: Primary | ICD-10-CM

## 2024-01-24 PROCEDURE — 99213 OFFICE O/P EST LOW 20 MIN: CPT | Mod: 95

## 2024-01-24 NOTE — PROGRESS NOTES
Telemedicine Visit: The patient's condition can be safely assessed and treated via synchronous audio and visual telemedicine encounter.      Reason for Telemedicine Visit: Patient has requested telehealth visit    Originating Site (Patient Location): Patient's home      Distant Location (provider location):  On-site    Consent:  The patient/guardian has verbally consented to: the potential risks and benefits of telemedicine (video visit) versus in person care; bill my insurance or make self-payment for services provided; and responsibility for payment of non-covered services.     Mode of Communication:  Video Conference via [employer]    As the provider I attest to compliance with applicable laws and regulations related to telemedicine.     CC: US review- hx ectopic pregnancy  S: Adelita is a  here for early ultrasound review with hx of ectopic pregnancy  -had US 1/15/23 PUL  Quant hcg   260  1/10 558   [id number]   [id number]  -denies pain or vaginal bleeding  -nausea in morning relieved by eating  -has significant constipation, had constipation and hemorrhoids with son's birth    O:  LMP 2023 (Approximate)   Past Medical History:   Diagnosis Date    History of blood transfusion     Migraines     Varicella      Current Outpatient Medications   Medication    Ascorbic Acid (VITAMIN C) 500 MG CAPS    cholecalciferol (VITAMIN D3) 25 mcg (1000 units) capsule    fish oil-omega-3 fatty acids 500 MG capsule    Prenatal Vit-DSS-Fe Cbn-FA (PRENATAL AD PO)     No current facility-administered medications for this visit.        Allergies   Allergen Reactions    Clindamycin Rash     Alert pleasant female NAD   RRR  Normal speech and mentation  EXAMINATION: US OB <14 WEEKS WITH TRANSVAGINAL SINGLE  2024 3:37  PM       HISTORY: Recommended close follow-up. Continue to track HCG quant, if  rising normally then ultrasound in one week.; Pregnancy of unknown  anatomic location        COMPARISON: 1/15/2024      PROCEDURE COMMENTS: Transabdominal and transvaginal imaging were  performed of the uterus and both adnexa.     FINDINGS:      There is a normally shaped gestational sac within the uterus with a  yolk sac and fetal pole. Amniotic fluid is grossly normal for age; SAÚL  not calculated. It is too early to determine placental site. Small  amount of free fluid in the rectouterine space.     The crown-rump length measures 3 mm corresponding to a gestational age  of 6 weeks, 0 days. Corresponding SHANTA is 2024. There is cardiac  activity with a heart rate of 112 bpm.     The right and left ovaries measure 2.0 cm x 4.3 cm x 2.7 cm and 1.3 cm  x 2.9 cm x 2.0 cm, respectively. Both ovaries are normal in  appearance. There is a corpus luteum on the right ovary.                                                                      IMPRESSION:   Single living intrauterine embryo with an ultrasound gestational age  of 6 weeks, 0 days corresponding to an ultrasound SHANTA of 2024.     I have personally reviewed the examination and initial interpretation  and I agree with the findings.     CHAD WEBER MD      A:Adelita is a  here for early ultrasound review with hx of ectopic pregnancy  P:  -US shows viable IUP at 6.1 ega by 6.0 US with shanta 24  -LMP not certain  -had early US at 5.2 with no fetal pole or yolk sac seen  -discussed US shows pregnancy in utero, ruled out ectopic ,no further quant trends or US. Had RN intake NOB/viability US  appx 10 wks GA  -call for any bleeding  -call if needing resources for nausea or vomiting.   -start colace daily, add miralax prn, consider glycerin supp if constipation not relieved, use annusol for hemorrhoids. Holley bottle, tucks pads, sitz baths as well  -continue PNV  1. Vitamin D deficiency  - Vitamin D Deficiency; Future    2. External hemorrhoids  - Adult Colorectal Surgery  Referral; Future      Rtc as scheduled for pregnancy  MATTIE Francisco  CNP  Time spent on telepone 13 mins

## 2024-01-30 LAB
ABO/RH(D): NORMAL
ANTIBODY SCREEN: NEGATIVE
SPECIMEN EXPIRATION DATE: NORMAL

## 2024-01-30 NOTE — TELEPHONE ENCOUNTER
Diagnosis, Referred by & from: Hemorrhoids   Appt date: 4/9/2024   NOTES STATUS DETAILS   OFFICE NOTE from referring provider Internal [facility]:  1/24/24 - [facility] OV with Sharon Russo NP   OFFICE NOTE from other specialist N/A    DISCHARGE SUMMARY from hospital Care Everywhere [facility]:  7/30/22 - Admission with Dr. Abreu   DISCHARGE REPORT from the ER N/A    OPERATIVE REPORT N/A    MEDICATION LIST Internal    LABS N/A    DIAGNOSTIC PROCEDURES N/A    IMAGING (DISC & REPORT) N/A

## 2024-01-31 ENCOUNTER — LAB (OUTPATIENT)
Dept: LAB | Facility: CLINIC | Age: 34
End: 2024-01-31
Payer: COMMERCIAL

## 2024-01-31 DIAGNOSIS — E55.9 VITAMIN D DEFICIENCY: ICD-10-CM

## 2024-01-31 DIAGNOSIS — Z34.90 ENCOUNTER FOR SUPERVISION OF NORMAL PREGNANCY: ICD-10-CM

## 2024-01-31 LAB
ALBUMIN UR-MCNC: NEGATIVE MG/DL
APPEARANCE UR: CLEAR
BILIRUB UR QL STRIP: NEGATIVE
COLOR UR AUTO: YELLOW
ERYTHROCYTE [DISTWIDTH] IN BLOOD BY AUTOMATED COUNT: 13.2 % (ref 10–15)
GLUCOSE UR STRIP-MCNC: NEGATIVE MG/DL
HBV SURFACE AG SERPL QL IA: NONREACTIVE
HCT VFR BLD AUTO: 39.6 % (ref 35–47)
HCV AB SERPL QL IA: NONREACTIVE
HGB BLD-MCNC: 13.3 G/DL (ref 11.7–15.7)
HGB UR QL STRIP: NEGATIVE
HIV 1+2 AB+HIV1 P24 AG SERPL QL IA: NONREACTIVE
KETONES UR STRIP-MCNC: NEGATIVE MG/DL
LEUKOCYTE ESTERASE UR QL STRIP: NEGATIVE
MCH RBC QN AUTO: 28.7 PG (ref 26.5–33)
MCHC RBC AUTO-ENTMCNC: 33.6 G/DL (ref 31.5–36.5)
MCV RBC AUTO: 85 FL (ref 78–100)
NITRATE UR QL: NEGATIVE
PH UR STRIP: 8 [PH] (ref 5–8)
PLATELET # BLD AUTO: 314 10E3/UL (ref 150–450)
RBC # BLD AUTO: 4.64 10E6/UL (ref 3.8–5.2)
RUBV IGG SERPL QL IA: 17.5 INDEX
RUBV IGG SERPL QL IA: POSITIVE
SP GR UR STRIP: 1.01 (ref 1–1.03)
T PALLIDUM AB SER QL: NONREACTIVE
UROBILINOGEN UR STRIP-ACNC: 0.2 E.U./DL
VIT D+METAB SERPL-MCNC: 26 NG/ML (ref 20–50)
WBC # BLD AUTO: 6.9 10E3/UL (ref 4–11)

## 2024-01-31 PROCEDURE — 86762 RUBELLA ANTIBODY: CPT

## 2024-01-31 PROCEDURE — 81003 URINALYSIS AUTO W/O SCOPE: CPT

## 2024-01-31 PROCEDURE — 86901 BLOOD TYPING SEROLOGIC RH(D): CPT

## 2024-01-31 PROCEDURE — 87086 URINE CULTURE/COLONY COUNT: CPT

## 2024-01-31 PROCEDURE — 86900 BLOOD TYPING SEROLOGIC ABO: CPT

## 2024-01-31 PROCEDURE — 86803 HEPATITIS C AB TEST: CPT

## 2024-01-31 PROCEDURE — 36415 COLL VENOUS BLD VENIPUNCTURE: CPT

## 2024-01-31 PROCEDURE — 86850 RBC ANTIBODY SCREEN: CPT

## 2024-01-31 PROCEDURE — 86780 TREPONEMA PALLIDUM: CPT

## 2024-01-31 PROCEDURE — 87340 HEPATITIS B SURFACE AG IA: CPT

## 2024-01-31 PROCEDURE — 82306 VITAMIN D 25 HYDROXY: CPT

## 2024-01-31 PROCEDURE — 85027 COMPLETE CBC AUTOMATED: CPT

## 2024-01-31 PROCEDURE — 87389 HIV-1 AG W/HIV-1&-2 AB AG IA: CPT

## 2024-02-01 LAB — BACTERIA UR CULT: NO GROWTH

## 2024-02-20 ENCOUNTER — ANCILLARY PROCEDURE (OUTPATIENT)
Dept: ULTRASOUND IMAGING | Facility: CLINIC | Age: 34
End: 2024-02-20
Payer: COMMERCIAL

## 2024-02-20 ENCOUNTER — PRENATAL OFFICE VISIT (OUTPATIENT)
Dept: OBGYN | Facility: CLINIC | Age: 34
End: 2024-02-20
Payer: COMMERCIAL

## 2024-02-20 VITALS
BODY MASS INDEX: 35.53 KG/M2 | OXYGEN SATURATION: 98 % | RESPIRATION RATE: 16 BRPM | HEART RATE: 84 BPM | WEIGHT: 193.1 LBS | SYSTOLIC BLOOD PRESSURE: 122 MMHG | DIASTOLIC BLOOD PRESSURE: 70 MMHG | HEIGHT: 62 IN | TEMPERATURE: 98 F

## 2024-02-20 DIAGNOSIS — Z34.81 ENCOUNTER FOR SUPERVISION OF OTHER NORMAL PREGNANCY IN FIRST TRIMESTER: Primary | ICD-10-CM

## 2024-02-20 DIAGNOSIS — Z34.90 ENCOUNTER FOR SUPERVISION OF NORMAL PREGNANCY: ICD-10-CM

## 2024-02-20 PROCEDURE — 99213 OFFICE O/P EST LOW 20 MIN: CPT | Performed by: OBSTETRICS & GYNECOLOGY

## 2024-02-20 PROCEDURE — 76801 OB US < 14 WKS SINGLE FETUS: CPT | Performed by: OBSTETRICS & GYNECOLOGY

## 2024-02-20 NOTE — PROGRESS NOTES
OB - New OB History and Physical    HPI: Adelita Conley is a 33 year old  at 10w0d as dated by LMP consistent with today's US. Since her LMP she denies vaginal bleeding or significant pelvic pain.        Obstetric history:     OB History    Para Term  AB Living   3 1 1 0 1 1   SAB IAB Ectopic Multiple Live Births   0 0 1 0 0      # Outcome Date GA Lbr Andrew/2nd Weight Sex Delivery Anes PTL Lv   3 Current            2 Term 22 39w0d  3.209 kg (7 lb 1.2 oz) M   N    1 Ectopic 21             Patient denies history of gestational hypertension, pre-eclampsia, gestational diabetes,  labor.    Gynecologic History:   Patient's last menstrual period was 2023 (approximate).   STI history: denies  Last Pap: NIL pap with negative HPV in   History of abnormal pap: denies    Allergy: Clindamycin    Current Medications:  Current Outpatient Medications   Medication    Ascorbic Acid (VITAMIN C) 500 MG CAPS    cholecalciferol (VITAMIN D3) 25 mcg (1000 units) capsule    fish oil-omega-3 fatty acids 500 MG capsule    Prenatal Vit-DSS-Fe Cbn-FA (PRENATAL AD PO)     No current facility-administered medications for this visit.       Past Medical History:  Past Medical History:   Diagnosis Date    History of blood transfusion     Migraines     Varicella        Past Surgical History:  Past Surgical History:   Procedure Laterality Date    NEPHRECTOMY Left     Living donor to father, required blood tranfusion       Social History:  Denies current tobacco, alcohol or recreational drug use.       Family History:  Family History   Problem Relation Age of Onset    No Known Problems Mother     IgA nephropathy Father     No Known Problems Brother     No Known Problems Sister     No Known Problems Son        Review of Systems  See HPI       Physical Exam:  Vitals:    24 1359   BP: 122/70   BP Location: Right arm   Patient Position: Sitting   Cuff Size: Adult Regular   Pulse: 84  "  Resp: 16   Temp: 98  F (36.7  C)   SpO2: 98%   Weight: 87.6 kg (193 lb 1.6 oz)   Height: 1.582 m (5' 2.28\")     Body mass index is 35 kg/m .    General: Patient alert and oriented, no acute distress  CV: no peripheral edema or cyanosis  Resp: normal respiratory effort and equal lung expansion  Ext: non-tender, no edema      Obstetrical Ultrasound Report  OB U/S  < 14 Weeks -  Transabdominal   [facility]  Referring Provider: Natacha Arce MD  Sonographer: Richard Sanchez RDMS  Indication:  Viability check  and Size and Dates     Dating (mm/dd/yyyy):   LMP: 12/09/2023            Working EDC:  09/17/2024          GA by Working EDC:        10w0d     Current Scan On:  2/20/2024          EDC:  09/13/2024            GA by Current Scan:        10w4d  The calculation of the gestational age by current scan was based on CRL.     Anatomy Scan:  Bello gestation.     Biometry:  CRL                       3.69 cm                10w4d                    Yolk Sac                 0.2 cm                                                     Fetal heart activity:  Rate and rhythm is within normal limits.  Fetal heart rate: 176 bpm  Findings: Single viable IUP, travis 2.3 x 1.6 x 1.9 cm     Maternal Structures:  Uterus: Fibroids visualized - 1.8 x 1.1 x 1.7 cm, 1.7 x 2.1 x 1.6 cm & 1.2 x 0.8 x 0.9 cm  Cervix: The cervix appears long and closed.  Right Ovary: Complex cyst 1.3 x 1.0 x 1.5 cm  Left Ovary: Wnl  Technique:Transvaginal Imaging performed     Impression:   Early bello viable intrauterine pregnancy with yolk sac and cardiac activity, measures 10w 0d by today's ultrasound, which is consistent with menstrual dating. Estimated Date of Delivery remains Sep 17, 2024.  There is a subchorionic hemorrhage that measures 2.3 x 1.6 x 1.9cm.  The uterus has multiple small fibroids - 1.8 x 1.1 x 1.7 cm, 1.7 x 2.1 x 1.6 cm & 1.2 x 0.8 x 0.9 cm.     Raya Zaragoza MD  Assessment:  Adelita " CESAR Conley is a 33 year old  at 10w0d presenting to establish prenatal care.    Problem List:   Solitary kidney, baseline creatine ordered but has previously been normal and no issues with HTN in prior pregnancy   Pre-pregnancy BMI 33.5, A1C ordered     Plan:  Reviewed routine prenatal care. Discussed MD call schedule as well as role of residents and med students both in clinic and hospital.  She is okay with resident care  Pap: UTD   Diet, Nutrition and Exercise:  Continue PNVs. Continue normal exercise. Her prepregnancy BMI is 33.5 .  According to the WHO guidelines, patient is given a goal of gaining approximately 11-20 pounds during the course of her pregnancy.    Immunizations: plan TdaP at 28 weeks  Fetal anomaly screening: genetics referral placed   Routine Prenatal Care: the patient will return to clinic in 4 weeks and merrick Arce MD

## 2024-02-28 ENCOUNTER — PRE VISIT (OUTPATIENT)
Dept: MATERNAL FETAL MEDICINE | Facility: CLINIC | Age: 34
End: 2024-02-28
Payer: COMMERCIAL

## 2024-02-29 NOTE — PROGRESS NOTES
[facility]  Genetic Counseling Consult    Patient:  Adelita Conley  Preferred Name: Adelita YOB: 1990   Date of Service:  3/05/24   MRN: [id number]    Adelita was seen at the [facility] for genetic consultation. The indication for genetic counseling is desire to discuss options for genetic screening and diagnostics. The patient was accompanied to this visit by their , Fidelia.    The session was conducted in English.      IMPRESSION/ PLAN   1. Adleita has not had genetic screening in this pregnancy but elected to have screening today.     2. During today's [facility] visit, Adelita had a blood draw for non-invasive prenatal testing (also called NIPT, NIPS, or cell-free DNA) through [facility] [employer] This NIPT screens for trisomy 21, 18, and 13 and the patient opted to screen for sex chromosome aneuploidies, including reported fetal sex. Results are expected in 7-10 days. The patient will be called with results and if they do not answer they requested a detailed message with results on their voicemail, including the predicted fetal sex information.  Patient was informed that results, including fetal sex, will be available in [employer].    3. Since the patient chose aneuploidy screening via NIPT, quad screen is NOT recommended in the second trimester. If the patient desires screening for open neural tube defects, maternal serum AFP only is recommended, ideally between 16-18 weeks gestation.    4. Adelita had a nuchal translucency ultrasound today. Please see the ultrasound report for further details.    5. Adelita asked that I send her the CPT codes for carrier screening via [employer] so that she can call her insurance and determine coverage. She will contact me if she wants to proceed.    PREGNANCY HISTORY   /Parity:       Adelita's pregnancy history is significant for:   2021: ectopic   2022: 39w0d, ,  "male    CURRENT PREGNANCY   Current Age: 33 year old   Age at Delivery: 34 year old  SHANTA: 2024, by Ultrasound                                   Gestational Age: 12w0d  This pregnancy is a single gestation.   This pregnancy was conceived spontaneously.    MEDICAL HISTORY   Adelita is s/p nephrectomy in 2016; she donated her kidney to her father. See Family History for more information.       FAMILY HISTORY   A three-generation pedigree was obtained today and is scanned under the \"Media\" tab in Epic. The family history was reported by Adelita and their partner.    The following significant findings were reported today:   Adelita's father has IgA nephropathy, also known as Lundberg disease.  IgA nephropathy is a form of glomerulonephritis caused by accumulation of IgA immunoglobulins in part of the kidney (glomerular basement membrane). This causes damage to the kidneys. We reviewed that less than 10% of cases are due to familial IgA nephropathy, in which multiple family members are affected. Familial cases appear to have multifactorial inheritance. The remaining 90% are sporadic causes with unknown etiologies. Given no one else is affected in the family, the risk to Adelita (and her children) is likely low, but non-zero. I encouraged Adelita to continue follow-up with her providers for her own management.   Adelita had five paternal uncles that passed away. One of the paternal uncles passed away in infancy (\"SIDS\"), and another passed away in his 50s due to a cancer (unknown type). Adelita was not aware of the causes of the other uncles' deaths.  We briefly reviewed that the SIDS and cancer family history are not likely to increase risk to Adelita.   We discussed how it is possible that her paternal uncle  in infancy due to an underlying congenital abnormality (such as a cardiac defect), a genetic syndrome (such as a metabolic diease or other syndrome), or potentially an infection. If more information is gathered regarding " these relatives, it would be reasonable to revisit this family history for a more accurate risk assessment.   No other family history of cancer was reported. We discussed how most cancer seen in families occurs sporadically, but some may be due to an underlying genetic etiology. This family history is not suspicious of an underlying hereditary cancer predisposition, however Adelita was encouraged to follow up if any new information arises.     Adelita's mother has hypertension and arthritis.   Adelita's maternal aunt had cancer (unknown type) and passed away in her 40s-50s. She had two children, who are healthy.  Adelita's other maternal aunt had a stillborn male child, in addition to two healthy daughters.   Family history such as stillbirths, infant deaths, or pregnancy complications can be difficult to assess, especially if this occurred many decades ago and/or details are scarce. It is challenging to assess if this family history modifies risks to the current pregnancy without additional information. However, it is unlikely to increase risk, especially if the history is several generations away from the current pregnancy. If more information is collected regarding this family history it should be revisited.    Fidelia's mother has vision and hearing loss that began in her 40s. Fidelia reports he did [employer], which identified a genetic variant related to hearing loss; he did not have additional details.  We reviewed the limitations of [employer]. We discussed that, without more information, it is difficult to assess if there is any risk to relatives to have vision and/or hearing loss. We reviewed that hearing loss is relatively common in the human population.  There are both environmental (acoustic trauma, ototoxic drug exposure, and bacterial or viral infections such as rubella or cytomegalovirus) and genetic causes. It is difficult to determine the recurrence risk for family members without a known cause for the hearing loss.  Inheritance patterns of genetic hearing loss can include AD, AR, X-linked and mitochondrial. If Fidelia obtains more information about his mother's history, this risk assessment can be revisited.     Otherwise, the reported family history is unremarkable for multiple miscarriages, birth defects, intellectual disabilities, known genetic conditions, and consanguinity.    RISK ASSESSMENT FOR CHROMOSOME CONDITIONS   We explained that the risk for fetal chromosome abnormalities increases with maternal age. We discussed specific features of common chromosome abnormalities, including Down syndrome, trisomy 13, trisomy 18, and sex chromosome trisomies.    At age 34 at midtrimester, the risk to have a baby with Down syndrome is 1 in 421.   At age 34 at midtrimester, the risk to have a baby with any chromosome abnormality is 1 in 217.    Adelita has not had genetic screening in this pregnancy but elected to have screening today.         RISK ASSESSMENT FOR INHERITED CONDITIONS AND CARRIER SCREENING OPTIONS   We discussed that every pregnancy has a chance to have an inherited single-gene condition, even when there is no family history of that condition. In fact, approximately 90% of couples at an increased reproductive risk for an inherited condition have no family history of that condition. The average person may be a carrier for 5-10 different genetic variants that can increase the chance for their pregnancies to have that condition. We discussed autosomal recessive conditions and X-linked conditions. Autosomal recessive conditions happen when a mutation has been inherited from the egg and sperm and include conditions like cystic fibrosis, thalassemia, hearing loss, spinal muscular atrophy, and more. X-linked conditions happen when a mutation has been inherited from the egg and include conditions like fragile X syndrome.     We reviewed that when both biological parents carry a harmful genetic change in a gene associated with  autosomal recessive inheritance, each of their pregnancies has a 1 in 4 (25%) chance to be affected by that condition. With x-linked conditions, the specific risk generally depends on the chromosomal sex of the fetus, with XY individuals (generally male) being most severely affected.     The patient does NOT have a family history of known inherited conditions. This does NOT mean the patient and/or their partner is not a carrier of a condition. Approximately 90% of couples at an increased reproductive risk for an inherited condition have no family history of that condition. The patient has not had carrier screening previously. The patient was not certain about whether to pursue carrier screening today. They will contact us if they would like to pursue screening. See below for the more detailed information we discussed.    We discussed that expanded carrier screening is designed to identify carrier status for conditions that are primarily childhood or adolescent onset. Expanded carrier screening does not evaluate for adult-onset conditions such as hereditary cancer syndromes, dementia/ Alzheimer's disease, or cardiovascular disease risk factors. Additionally, expanded carrier screening is not comprehensive for all known genetic diseases or inherited conditions. It will not intentionally screen for autosomal dominant conditions. This is a screening test, and residual carrier status risk figures will be provided to the patient after results become available. Carrier screening is not meant to diagnose the patient with a condition, and generally carriers are asymptomatic. However, certain genes may confer increased risks for various health concerns in carriers (including, but not limited to: HELENA, DMD, FMR1).    Adelita asked that I send her the CPT codes via [email or web address] so that she can call her insurance and determine coverage. She will contact me if she wants to proceed with carrier screening.    GENETIC TESTING OPTIONS    Genetic testing during a pregnancy includes screening and diagnostic procedures.      Screening tests are non-invasive which means no risk to the pregnancy and includes ultrasounds and blood work. The benefits and limitations of screening were reviewed. Screening tests provide a risk assessment (chance) specific to the pregnancy for certain fetal chromosome abnormalities but cannot definitively diagnose or exclude a fetal chromosome abnormality. Follow-up genetic counseling and consideration of diagnostic testing is recommended with any abnormal screening result. Diagnostic testing during a pregnancy is more certain and can test for more conditions. However, the tests do have a risk of miscarriage that requires careful consideration. These tests can detect fetal chromosome abnormalities with greater than 99% certainty. Results can be compromised by maternal cell contamination or mosaicism and are limited by the resolution of current genetic testing technology.     There is no screening or diagnostic test that detects all forms of birth defects or intellectual disability.     We discussed the following screening options:     Non-invasive prenatal testing (NIPT)  Also called cell-free DNA screening because it detects chromosomes from the placenta in the pregnant person's blood  Can be done any time after 10 weeks gestation  Standard recommendation for NIPT screens for trisomy 21, trisomy 18, trisomy 13, with the option of adding sex chromosome aneuploidies, without or without predicted sex  Cannot screen for open neural tube defects, maternal serum AFP after 15 weeks is recommended  New NIPT options include screening for other trisomies, microdeletion syndromes, and in some cases fetal blood antigens.  We discussed that [employer] offers screening for five microdeletion syndromes: 15q11.2, 1p36, 22q11.2, 4p, and 5p deletions.Guidelines do not recommend these conditions are included in standard screening.  These options have limitations and should be discussed with a genetic counselor.   Current (2023) ACMG guidelines do recommend that screening for 22q11.2 deletion syndrome be offered to all pregnant patients. 22q11.2 deletion syndrome has an estimated prevalence of 1 in 990 to 1 in 2148 (0.05-0.1%). Risk is not thought to increase with maternal age. Clinical features are variable but include congenital heart defects, cleft palate, developmental delays, immune system deficiencies, and hearing loss. Approximately 90% of cases are de georgi (a sporadic new change in a pregnancy).   We reviewed that [employer]'s screening for 22q11.2 deletion syndrome will also automatically include the other microdeletion syndromes. We discussed the limitations of cell-free DNA screening in detecting microdeletions and the possiblity of false positives and false negatives The patient declined microdeletion syndrome screening.  Carrier screening  Risk assessment for certain autosomal recessive and x-linked conditions. These conditions are generally infantile- or childhood-onset conditions.   Can be done any time during the pregnancy or prior to pregnancy.  Can screen for over 500 different genetic conditions.  Is not intended to diagnosis a condition in the carrier parent.    Even with negative results, a residual risk for screened conditions remains.      We discussed the following ultrasound options:  Nuchal translucency (NT) ultrasound  Ultrasound between [id number] that includes nuchal translucency measurement and nasal bone assessments  Nuchal translucency refers to the space at the back of the neck where fluid builds up. All babies at this stage have fluid and there is only concern if there is too much fluid  Nasal bone refers to the small bone in the nose. There is concern for conditions like Down syndrome if the bone cannot be seen at all  This ultrasound can be done as part of first trimester screening, at the same time as another  screen (NIPT), at the same time as a CVS, or if the patients does not want genetic screening.  Markers on ultrasound detects about 70% of pregnancies with aneuploidy  Abnormalities on NT ultrasound can also increase the risk for a birth defect, like a heart defect    Comprehensive level II ultrasound (Fetal Anatomy Ultrasound)  Ultrasound done between 18-20 weeks gestation  Screens for major birth defects and markers for aneuploidy (like trisomy 21 and trisomy 18)  Includes looking at the fetus/baby's growth, heart, organs (stomach, kidneys), placenta, and amniotic fluid    We discussed the following diagnostic options:     Chorionic villus sampling (CVS)  Invasive diagnostic procedure done between 10w0d and 13w6d  The procedure collects a small sample from the placenta for the purpose of chromosomal testing and/or other genetic testing  Diagnostic result; more than 99% sensitivity for fetal chromosome abnormalities  Cannot screen for open neural tube defects, maternal serum AFP after 15 weeks is recommended    Amniocentesis  Invasive diagnostic procedure done after 15 weeks gestation  The procedure collects a small sample of amniotic fluid for the purpose of chromosomal testing and/or other genetic testing  Diagnostic result; more than 99% sensitivity for fetal chromosome abnormalities  Testing for AFP in the amniotic fluid can test for open neural tube defects    It was a pleasure to be involved with [facility]elita [facility]. Face-to-face time of the meeting was 45 minutes.    Junie Paulino MS, [facility]  Certified and Licensed Genetic Counselor  [facility]  Maternal Fetal Medicine  Office: [phone]  Pager: [phone]  [facility]: [phone]   Fax: [phone]  [facility]

## 2024-03-05 ENCOUNTER — MEDICAL CORRESPONDENCE (OUTPATIENT)
Dept: HEALTH INFORMATION MANAGEMENT | Facility: CLINIC | Age: 34
End: 2024-03-05

## 2024-03-05 ENCOUNTER — LAB (OUTPATIENT)
Dept: LAB | Facility: CLINIC | Age: 34
End: 2024-03-05
Attending: OBSTETRICS & GYNECOLOGY
Payer: COMMERCIAL

## 2024-03-05 ENCOUNTER — OFFICE VISIT (OUTPATIENT)
Dept: MATERNAL FETAL MEDICINE | Facility: CLINIC | Age: 34
End: 2024-03-05
Attending: OBSTETRICS & GYNECOLOGY
Payer: COMMERCIAL

## 2024-03-05 ENCOUNTER — HOSPITAL ENCOUNTER (OUTPATIENT)
Dept: ULTRASOUND IMAGING | Facility: CLINIC | Age: 34
Discharge: HOME | End: 2024-03-05
Attending: OBSTETRICS & GYNECOLOGY
Payer: COMMERCIAL

## 2024-03-05 DIAGNOSIS — Z34.81 ENCOUNTER FOR SUPERVISION OF OTHER NORMAL PREGNANCY IN FIRST TRIMESTER: Primary | ICD-10-CM

## 2024-03-05 DIAGNOSIS — Z34.81 ENCOUNTER FOR SUPERVISION OF OTHER NORMAL PREGNANCY IN FIRST TRIMESTER: ICD-10-CM

## 2024-03-05 DIAGNOSIS — O99.211 OBESITY AFFECTING PREGNANCY IN FIRST TRIMESTER, UNSPECIFIED OBESITY TYPE: Primary | ICD-10-CM

## 2024-03-05 DIAGNOSIS — O26.90 PREGNANCY RELATED CONDITION, ANTEPARTUM: ICD-10-CM

## 2024-03-05 DIAGNOSIS — Z36.82 ENCOUNTER FOR (NT) NUCHAL TRANSLUCENCY SCAN: ICD-10-CM

## 2024-03-05 LAB
CREAT SERPL-MCNC: 0.6 MG/DL (ref 0.51–0.95)
EGFRCR SERPLBLD CKD-EPI 2021: >90 ML/MIN/1.73M2
HBA1C MFR BLD: 5.2 %

## 2024-03-05 PROCEDURE — 96040 HC GENETIC COUNSELING, EACH 30 MINUTES: CPT

## 2024-03-05 PROCEDURE — 36415 COLL VENOUS BLD VENIPUNCTURE: CPT | Performed by: OBSTETRICS & GYNECOLOGY

## 2024-03-05 PROCEDURE — 36415 COLL VENOUS BLD VENIPUNCTURE: CPT

## 2024-03-05 PROCEDURE — 76813 OB US NUCHAL MEAS 1 GEST: CPT | Mod: 26 | Performed by: OBSTETRICS & GYNECOLOGY

## 2024-03-05 PROCEDURE — 76813 OB US NUCHAL MEAS 1 GEST: CPT

## 2024-03-05 PROCEDURE — 83036 HEMOGLOBIN GLYCOSYLATED A1C: CPT

## 2024-03-05 PROCEDURE — 82565 ASSAY OF CREATININE: CPT

## 2024-03-05 NOTE — PROGRESS NOTES
Please see the imaging tab for details of the ultrasound performed today.    Maddi Hall MD  Specialist in Maternal-Fetal Medicine

## 2024-03-12 ENCOUNTER — TELEPHONE (OUTPATIENT)
Dept: MATERNAL FETAL MEDICINE | Facility: CLINIC | Age: 34
End: 2024-03-12
Payer: COMMERCIAL

## 2024-03-12 LAB — SCANNED LAB RESULT: NORMAL

## 2024-03-12 NOTE — TELEPHONE ENCOUNTER
March 12, 2024    I left a message for Adelita regarding her NIPT results.      Results indicate NO ANEUPLOIDY DETECTED for chromosomes 21, 18, 13, or sex chromosomes (XY).     This puts her current pregnancy at low risk for Down syndrome, trisomy 18, trisomy 13 and sex chromosome abnormalities. This test is reported to have the following sensitivities: Down syndrome: 99.7%, trisomy 18: 97.9%, trisomy 13: 99.0%, and monosomy X: 95.8%. There is limited data about microdeletion syndromes, precluding specific performance calculations; overall, risk is likely reduced. Although these results are reassuring, this does not replace a standard chromosome analysis from a chorionic villus sampling or amniocentesis.     MSAFP is the appropriate second trimester screening test for open neural tube defects; the maternal quad screen is not recommended.    Her results are available in her Epic chart for her primary OB to review.    Junie Paulino MS, [facility]  Board Certified and Licensed Genetic Counselor  [facility]  Maternal Fetal Medicine  Office: [phone]  Pager: [phone]  [facility]: [phone]   Fax: [phone]  [facility]

## 2024-03-16 ENCOUNTER — NURSE TRIAGE (OUTPATIENT)
Dept: NURSING | Facility: CLINIC | Age: 34
End: 2024-03-16
Payer: COMMERCIAL

## 2024-03-16 ENCOUNTER — HOSPITAL ENCOUNTER (EMERGENCY)
Facility: CLINIC | Age: 34
Discharge: HOME | End: 2024-03-16
Admitting: PHYSICIAN ASSISTANT
Payer: COMMERCIAL

## 2024-03-16 VITALS
HEIGHT: 62 IN | TEMPERATURE: 99.3 F | DIASTOLIC BLOOD PRESSURE: 73 MMHG | OXYGEN SATURATION: 100 % | RESPIRATION RATE: 18 BRPM | WEIGHT: 188 LBS | BODY MASS INDEX: 34.6 KG/M2 | SYSTOLIC BLOOD PRESSURE: 98 MMHG | HEART RATE: 98 BPM

## 2024-03-16 DIAGNOSIS — R11.2 NAUSEA VOMITING AND DIARRHEA: ICD-10-CM

## 2024-03-16 DIAGNOSIS — R19.7 NAUSEA VOMITING AND DIARRHEA: ICD-10-CM

## 2024-03-16 LAB
ANION GAP SERPL CALCULATED.3IONS-SCNC: 11 MMOL/L (ref 7–15)
BASOPHILS # BLD AUTO: 0 10E3/UL (ref 0–0.2)
BASOPHILS NFR BLD AUTO: 0 %
BUN SERPL-MCNC: 12.4 MG/DL (ref 6–20)
CALCIUM SERPL-MCNC: 8.7 MG/DL (ref 8.6–10)
CHLORIDE SERPL-SCNC: 103 MMOL/L (ref 98–107)
CREAT SERPL-MCNC: 0.61 MG/DL (ref 0.51–0.95)
DEPRECATED HCO3 PLAS-SCNC: 21 MMOL/L (ref 22–29)
EGFRCR SERPLBLD CKD-EPI 2021: >90 ML/MIN/1.73M2
EOSINOPHIL # BLD AUTO: 0 10E3/UL (ref 0–0.7)
EOSINOPHIL NFR BLD AUTO: 0 %
ERYTHROCYTE [DISTWIDTH] IN BLOOD BY AUTOMATED COUNT: 12.9 % (ref 10–15)
GLUCOSE SERPL-MCNC: 121 MG/DL (ref 70–99)
HCT VFR BLD AUTO: 41.3 % (ref 35–47)
HGB BLD-MCNC: 14.1 G/DL (ref 11.7–15.7)
IMM GRANULOCYTES # BLD: 0 10E3/UL
IMM GRANULOCYTES NFR BLD: 0 %
LYMPHOCYTES # BLD AUTO: 0.4 10E3/UL (ref 0.8–5.3)
LYMPHOCYTES NFR BLD AUTO: 5 %
MCH RBC QN AUTO: 29.2 PG (ref 26.5–33)
MCHC RBC AUTO-ENTMCNC: 34.1 G/DL (ref 31.5–36.5)
MCV RBC AUTO: 86 FL (ref 78–100)
MONOCYTES # BLD AUTO: 0.2 10E3/UL (ref 0–1.3)
MONOCYTES NFR BLD AUTO: 3 %
NEUTROPHILS # BLD AUTO: 7.6 10E3/UL (ref 1.6–8.3)
NEUTROPHILS NFR BLD AUTO: 91 %
NRBC # BLD AUTO: 0 10E3/UL
NRBC BLD AUTO-RTO: 0 /100
PLATELET # BLD AUTO: 286 10E3/UL (ref 150–450)
POTASSIUM SERPL-SCNC: 3.8 MMOL/L (ref 3.4–5.3)
RBC # BLD AUTO: 4.83 10E6/UL (ref 3.8–5.2)
SODIUM SERPL-SCNC: 135 MMOL/L (ref 135–145)
WBC # BLD AUTO: 8.3 10E3/UL (ref 4–11)

## 2024-03-16 PROCEDURE — 96361 HYDRATE IV INFUSION ADD-ON: CPT

## 2024-03-16 PROCEDURE — 36415 COLL VENOUS BLD VENIPUNCTURE: CPT | Performed by: PHYSICIAN ASSISTANT

## 2024-03-16 PROCEDURE — 99284 EMERGENCY DEPT VISIT MOD MDM: CPT | Mod: 25

## 2024-03-16 PROCEDURE — 258N000003 HC RX IP 258 OP 636: Performed by: PHYSICIAN ASSISTANT

## 2024-03-16 PROCEDURE — 80048 BASIC METABOLIC PNL TOTAL CA: CPT | Performed by: PHYSICIAN ASSISTANT

## 2024-03-16 PROCEDURE — 96375 TX/PRO/DX INJ NEW DRUG ADDON: CPT

## 2024-03-16 PROCEDURE — 96374 THER/PROPH/DIAG INJ IV PUSH: CPT

## 2024-03-16 PROCEDURE — 85025 COMPLETE CBC W/AUTO DIFF WBC: CPT | Performed by: PHYSICIAN ASSISTANT

## 2024-03-16 PROCEDURE — 250N000011 HC RX IP 250 OP 636: Performed by: PHYSICIAN ASSISTANT

## 2024-03-16 RX ORDER — DIPHENHYDRAMINE HYDROCHLORIDE 50 MG/ML
25 INJECTION INTRAMUSCULAR; INTRAVENOUS ONCE
Status: COMPLETED | OUTPATIENT
Start: 2024-03-16 | End: 2024-03-16

## 2024-03-16 RX ORDER — PROCHLORPERAZINE MALEATE 10 MG
10 TABLET ORAL EVERY 6 HOURS PRN
Qty: 10 TABLET | Refills: 0 | Status: SHIPPED | OUTPATIENT
Start: 2024-03-16 | End: 2024-04-10

## 2024-03-16 RX ADMIN — PROCHLORPERAZINE EDISYLATE 10 MG: 5 INJECTION INTRAMUSCULAR; INTRAVENOUS at 10:03

## 2024-03-16 RX ADMIN — SODIUM CHLORIDE 1000 ML: 9 INJECTION, SOLUTION INTRAVENOUS at 10:03

## 2024-03-16 RX ADMIN — DIPHENHYDRAMINE HYDROCHLORIDE 25 MG: 50 INJECTION INTRAMUSCULAR; INTRAVENOUS at 10:03

## 2024-03-16 ASSESSMENT — ACTIVITIES OF DAILY LIVING (ADL): ADLS_ACUITY_SCORE: 35

## 2024-03-16 ASSESSMENT — COLUMBIA-SUICIDE SEVERITY RATING SCALE - C-SSRS
6. HAVE YOU EVER DONE ANYTHING, STARTED TO DO ANYTHING, OR PREPARED TO DO ANYTHING TO END YOUR LIFE?: NO
2. HAVE YOU ACTUALLY HAD ANY THOUGHTS OF KILLING YOURSELF IN THE PAST MONTH?: NO
1. IN THE PAST MONTH, HAVE YOU WISHED YOU WERE DEAD OR WISHED YOU COULD GO TO SLEEP AND NOT WAKE UP?: NO

## 2024-03-16 NOTE — ED PROVIDER NOTES
EMERGENCY DEPARTMENT ENCOUNTER   NAME: Adelita Conley ; AGE: 33 year old female ; YOB: 1990 ; MRN: [id number] ; PCP: Urmila Gillette     Evaluation Date & Time: No admission date for patient encounter.    ED Provider: Yana Stallworth PA-C    CHIEF COMPLAINT     Nausea, Vomiting, & Diarrhea      FINAL ASSESSMENT       ICD-10-CM    1. Nausea vomiting and diarrhea  R11.2     R19.7           ED COURSE, MEDICAL DECISION MAKING, PLAN     ED course   9:09 AM: Evaluated patient. Performed physical exam. Plan for antiemetics, fluids, and labs.   10:45 AM: Rechecked patient. She is resting comfortably.   10:57 AM: Was planning do give a 2nd liter of fluid, but patient feels much better and would like to discharge. I think this is reasonable. She will discharge by RN now.    ______________________________________________________________________    Adelita Conley is a 33 year old  female who is 13w4d pregnant with no other pertinent medical history presenting for nausea, vomiting, and diarrhea that started about 8 PM last evening.  Possibly food related as her mom has the same symptoms.  Both of them started with symptoms after eating some tacos for lunch yesterday. No fevers. No vaginal bleeding. No abdominal pain.     On exam patient is non toxic appearing. No acute distress. Oropharynx is a little dry. No abdominal pain. BS present. No peripheral edema.   , but otherwise vitally normal.     Considered gastroenteritis, food poisoning, hyperemesis gravidarum.    CBC without leukocytosis or anemia.  BMP with a slightly elevated glucose at 121 otherwise unremarkable.    History and exam consistent with food poisoning considering her mom developed the exact same symptoms yesterday after eating the same food.  Did also consider viral gastroenteritis.  Very low concern for an acutely serious intra-abdominal pathology such as appendicitis, pregnancy complication, cholecystitis, SBO.  He has no abdominal pain.  No  vaginal bleeding or other abnormal vaginal discharge.  No systemic signs or symptoms.    Patient was given 1 L of normal saline, 10 mg of Compazine, 25 mg of Benadryl.  On recheck she is feeling much improved.  I did offer to give her a second liter of fluids, but she declined.  Pulse has normalized after fluids.  She has not had any further vomiting or diarrhea since arriving.    Plan to discharge patient to home with Compazine to be used as needed.  At this point I do not feel patient requires hospitalization.  There are no red flag signs or symptoms present on exam to suggest an acutely serious or life-threatening condition.    Recommended pushing fluids.  Use Tylenol as needed.  Use the Compazine as needed.  [place] diet as tolerated.    Indications for reevaluation back in the ER discussed with patient.    Patient will discharge to home in good condition.      ______________________________________________________________________    *All pertinent lab & imaging studies independently reviewed. (See chart for details)   *Discussed the results of all the tests and plan with patient and family/guardians.   *All questions were answered.   *The patient and/or family/guardian acknowledged understanding and was agreeable with the care plan.      HISTORY OF PRESENT ILLNESS   Patient information was obtained from: Patient  Use of Intrepreter: N/A     Adelita Conley is a 33 year old  female who is 13w4d pregnant with no other pertinent medical history who presents to the ED by means of walk-in for evaluation of nausea, vomiting, and diarrhea that started about 8 PM last evening.    Patient states that she felt fine all day yesterday, went and had some tacos with her mom and boyfriend, and then around 8 PM last evening symptoms started suddenly.    She states she has had about 8 or 9 episodes of vomiting and 3 episodes of diarrhea since symptoms started.  Has been trying to take fluids, but everything just comes back  "out.    She reports that her mom has the same symptoms, but her boyfriend does not.    He has not had any abdominal pain or cramping.  No urinary symptoms.  No back pain.  No chest pain or shortness of breath.  No vaginal bleeding or discharge.  No fevers.  No cough, runny nose, sore throat.    No other concerns.      MEDICAL HISTORY     Past Medical History:   Diagnosis Date    History of blood transfusion     Migraines     Varicella        Past Surgical History:   Procedure Laterality Date    NEPHRECTOMY Left 2016    Living donor to father, required blood tranfusion       Family History   Problem Relation Age of Onset    No Known Problems Mother     IgA nephropathy Father     No Known Problems Brother     No Known Problems Sister     No Known Problems Son        Social History     Tobacco Use    Smoking status: Never     Passive exposure: Never    Smokeless tobacco: Never   Vaping Use    Vaping Use: Never used   Substance Use Topics    Alcohol use: Not Currently    Drug use: Never       prochlorperazine (COMPAZINE) 10 MG tablet  Ascorbic Acid (VITAMIN C) 500 MG CAPS  cholecalciferol (VITAMIN D3) 25 mcg (1000 units) capsule  fish oil-omega-3 fatty acids 500 MG capsule  Prenatal Vit-DSS-Fe Cbn-FA (PRENATAL AD PO)          PHYSICAL EXAM     First Vitals:  Patient Vitals for the past 24 hrs:   BP Temp Temp src Pulse Resp SpO2 Height Weight   03/16/24 1100 98/73 -- -- 98 -- 100 % -- --   03/16/24 1030 109/57 -- -- 99 -- 99 % -- --   03/16/24 1015 109/59 -- -- 104 -- 96 % -- --   03/16/24 0900 128/75 99.3  F (37.4  C) Oral 120 18 96 % 1.575 m (5' 2\") 85.3 kg (188 lb)         PHYSICAL EXAM:   Constitutional: No acute distress.  Neuro: Awake and alert.   Psych: Calm and cooperative.  Eyes: PERRL. EOMI. Conjunctivae clear.   Mouth: Oropharynx is a little dry.   Cardio: . Adequate perfusion to extremities. Regular rhythm. No murmurs.  Pulmonary: Oxygenating well on RA. No labored breathing. CTA b/l.  Abdomen: BS " present. Soft and non-distended. No palpable pain.  Back: Appears to move freely.  No CVA tenderness.   Upper extremities: Moves freely.   Lower extremities: Moves freely. No edema.   Skin: Natural color, warm, dry, intact.       RESULTS     LAB:  All pertinent labs reviewed and interpreted  Labs Ordered and Resulted from Time of ED Arrival to Time of ED Departure   BASIC METABOLIC PANEL - Abnormal       Result Value    Sodium 135      Potassium 3.8      Chloride 103      Carbon Dioxide (CO2) 21 (*)     Anion Gap 11      Urea Nitrogen 12.4      Creatinine 0.61      GFR Estimate >90      Calcium 8.7      Glucose 121 (*)    CBC WITH PLATELETS AND DIFFERENTIAL - Abnormal    WBC Count 8.3      RBC Count 4.83      Hemoglobin 14.1      Hematocrit 41.3      MCV 86      MCH 29.2      MCHC 34.1      RDW 12.9      Platelet Count 286      % Neutrophils 91      % Lymphocytes 5      % Monocytes 3      % Eosinophils 0      % Basophils 0      % Immature Granulocytes 0      NRBCs per 100 WBC 0      Absolute Neutrophils 7.6      Absolute Lymphocytes 0.4 (*)     Absolute Monocytes 0.2      Absolute Eosinophils 0.0      Absolute Basophils 0.0      Absolute Immature Granulocytes 0.0      Absolute NRBCs 0.0         RADIOLOGY:  No orders to display       ECG:    NA      PROCEDURES     None             History:  Supplemental history from: Documented in chart  External Record(s) reviewed: Documented in chart    Work Up:  Chart documentation includes differentials considered and any EKGs or imaging independently interpreted by provider, where specified.  In additional to work up documented, I considered the following work up: Documented in chart, if applicable.    External consultation:  Discussion of management with another provider: Documented in chart, if applicable    Complicating factors:  Care impacted by chronic illness: N/A  Care affected by social determinants of health: N/A    Disposition considerations: Discharge. I prescribed  additional prescription strength medication(s) as charted. See documentation for any additional details.    FINAL IMPRESSION:    ICD-10-CM    1. Nausea vomiting and diarrhea  R11.2     R19.7             MEDICATIONS GIVEN IN THE EMERGENCY DEPARTMENT:  Medications   sodium chloride 0.9% BOLUS 1,000 mL (0 mLs Intravenous Stopped 3/16/24 1056)   prochlorperazine (COMPAZINE) injection 10 mg (10 mg Intravenous $Given 3/16/24 1003)   diphenhydrAMINE (BENADRYL) injection 25 mg (25 mg Intravenous $Given 3/16/24 1003)   sodium chloride 0.9% BOLUS 1,000 mL (1,000 mLs Intravenous Not Given 3/16/24 1105)         NEW PRESCRIPTIONS STARTED AT TODAY'S ED VISIT:  Discharge Medication List as of 3/16/2024 10:59 AM        START taking these medications    Details   prochlorperazine (COMPAZINE) 10 MG tablet Take 1 tablet (10 mg) by mouth every 6 hours as needed for nausea or vomiting, Disp-10 tablet, R-0, Local Print                  Some or all of this documentation has been completed using dictation software and mild grammatical errors may be present. Please contact me with any concerns regarding this.       Yana Stallworth PA-C  Emergency Medicine   [facility] EMERGENCY ROOM       Yana Stallworth PA-C  03/16/24 1116

## 2024-03-16 NOTE — ED NOTES
"Pt reports feels \"much better\". Requesting to discharge rather than receive second liter IVF. Provider updated.   "

## 2024-03-16 NOTE — TELEPHONE ENCOUNTER
"Patient is 14 weeks pregnant  and due date is  2024 and is 3rd pregnancy.  Patient states that it is close to 12 hours that she cannot keep any fluids down.  Diarrhea is also present.  Patient is vomiting from 8 pm last night until now 7 times vomiting.   Diarrhea x3.  PCP is [facility] Natacha Arce    OB Triage Call      Is patient's OB/Midwife with the [facility] or [facility]? [facility]- Proceed with triage     Reason for call: Vomiting 7 times and diarrhea x3    Assessment: Patient calling and states that she cannot keep anything down as she is vomiting and has diarrhea.    Plan: Go to ER    Patient plans to deliver at [facility]    Patient's primary OB Provider is Natacha Arce.      Per protocol recommendations is to go to ER.      Is patient's delivering hospital on divert? No      13w4d    Estimated Date of Delivery: Sep 17, 2024        OB History    Para Term  AB Living   3 1 1 0 1 1   SAB IAB Ectopic Multiple Live Births   0 0 1 0 0      # Outcome Date GA Lbr Andrew/2nd Weight Sex Delivery Anes PTL Lv   3 Current            2 Term 22 39w0d  3.209 kg (7 lb 1.2 oz) M   N    1 Ectopic 21               No results found for: \"GBS\"       Yasmine Bansal RN 24 7:44 AM  [facility] Nurse Advisor      Reason for Disposition   [1] SEVERE vomiting (e.g., 6 or more times / day) AND [2] present > 8 hours    Additional Information   Negative: Sounds like a life-threatening emergency to the triager   Negative: [1] Vomiting AND [2] contains red blood or black (\"coffee ground\") material  (Exception: Few red streaks in vomit that only happened once.)   Negative: [1] Insulin-dependent diabetes (Type I) AND [2] glucose > 400 mg/dl (22 mmol/l)   Negative: Recent head injury (within last 3 days)   Negative: Recent abdominal injury (within last 3 days)   Negative: Severe pain in one eye    Protocols used: Pregnancy - Morning Sickness (Nausea and " Vomiting of Pregnancy)-A-AH

## 2024-03-16 NOTE — DISCHARGE INSTRUCTIONS
Take the antinausea medication as needed.  Make sure to push fluids.  [place] diet as tolerated.  Return to the ED for any new or worsening symptoms.

## 2024-03-16 NOTE — ED TRIAGE NOTES
Patient that is 14 weeks pregnant reports that she began to have N/V/D last night after dinner, she reports that had some nausea and emesis during her first trimester, but this is worse and she reports that her mother has similar symptoms.  Noris Romero RN.......3/16/2024 9:02 AM     Triage Assessment (Adult)       Row Name 03/16/24 0901          Triage Assessment    Airway WDL WDL        Respiratory WDL    Respiratory WDL WDL        Skin Circulation/Temperature WDL    Skin Circulation/Temperature WDL WDL        Cardiac WDL    Cardiac WDL WDL        Peripheral/Neurovascular WDL    Peripheral Neurovascular WDL WDL        Cognitive/Neuro/Behavioral WDL    Cognitive/Neuro/Behavioral WDL WDL

## 2024-03-25 ENCOUNTER — MYC MEDICAL ADVICE (OUTPATIENT)
Dept: SURGERY | Facility: CLINIC | Age: 34
End: 2024-03-25
Payer: COMMERCIAL

## 2024-04-09 ENCOUNTER — PRE VISIT (OUTPATIENT)
Dept: SURGERY | Facility: CLINIC | Age: 34
End: 2024-04-09

## 2024-04-09 ENCOUNTER — OFFICE VISIT (OUTPATIENT)
Dept: SURGERY | Facility: CLINIC | Age: 34
End: 2024-04-09
Payer: COMMERCIAL

## 2024-04-09 VITALS
HEART RATE: 90 BPM | BODY MASS INDEX: 35.15 KG/M2 | DIASTOLIC BLOOD PRESSURE: 79 MMHG | HEIGHT: 62 IN | SYSTOLIC BLOOD PRESSURE: 113 MMHG | OXYGEN SATURATION: 98 % | WEIGHT: 191 LBS

## 2024-04-09 DIAGNOSIS — K64.4 EXTERNAL HEMORRHOIDS: ICD-10-CM

## 2024-04-09 DIAGNOSIS — K59.09 OTHER CONSTIPATION: Primary | ICD-10-CM

## 2024-04-09 PROCEDURE — 99203 OFFICE O/P NEW LOW 30 MIN: CPT | Performed by: NURSE PRACTITIONER

## 2024-04-09 ASSESSMENT — PAIN SCALES - GENERAL: PAINLEVEL: NO PAIN (0)

## 2024-04-09 NOTE — LETTER
"2024       RE: Adelita Conley  [address]  [facility] [address]     Dear Colleague,    Thank you for referring your patient, Adelita Conley, to the [facility] [place] [facility]. Please see a copy of my visit note below.    Colon and Rectal Surgery Consult Clinic Note    Date: 2024     Referring provider:  MATTIE Francisco CNP  [address]  [place] [address]     RE: Adelita Conley  : 1990  MICHAELA: 2024    Adelita Conley is a very pleasant 33 year old female who is currently 17 weeks pregnant here for hemorrhoids.    HPI:  Had hemorrhoids with first pregnancy. This burst but never completely resolved between pregnancies. Still has a hemorrhoid present but this is not painful. Has some mild swelling. Is having constipation even with Metamucil. Having 1-2 hard stools a day. No bleeding. No procedures. Has extra skin externally.    Physical Examination:  /79 (BP Location: Left arm, Patient Position: Sitting, Cuff Size: Adult Large)   Pulse 90   Ht 5' 2\"   Wt 191 lb   LMP 2023 (Approximate)   SpO2 98%   BMI 34.93 kg/m    General: alert, oriented, in no acute distress, sitting comfortably  HEENT: mucous membranes moist    Assessment/Plan: 33 year old female who developed hemorrhoids with prior pregnancy and wants to try to prevent hemorrhoids with current pregnancy. She has constipation and advised starting Miralax daily in addition to her fiber supplement to keep stools soft. Hemorrhoids are not currently symptomatic. If she is bothered by external skin tags, would wait until after she is done having children to discuss surgical intervention for this. She can follow up as needed for any worsening symptoms, questions, or concerns. Patient's questions were answered to her stated satisfaction and she is in agreement with this plan.     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     Priority: Medium    History of blood transfusion 01/23/2024     Priority: Medium    Need for Tdap vaccination 01/23/2024     Priority: Medium    Pregnancy of unknown anatomic location 01/09/2024     Priority: Medium       Medications:  Current Outpatient Medications   Medication Sig Dispense Refill    Ascorbic Acid (VITAMIN C) 500 MG CAPS       cholecalciferol (VITAMIN D3) 25 mcg (1000 units) capsule Take 1 capsule by mouth daily      fish oil-omega-3 fatty acids 500 MG capsule       Prenatal Vit-DSS-Fe Cbn-FA (PRENATAL AD PO)       prochlorperazine (COMPAZINE) 10 MG tablet Take 1 tablet (10 mg) by mouth every 6 hours as needed for nausea or vomiting 10 tablet 0       Allergies:  Allergies   Allergen Reactions    Clindamycin Rash       Family history:  Family History   Problem Relation Age of Onset    No Known Problems Mother     IgA nephropathy Father     No Known Problems Brother     No Known Problems Sister     No Known Problems Son        Social history:  Social History     Tobacco Use    Smoking status: Never     Passive exposure: Never    Smokeless tobacco: Never   Substance Use Topics    Alcohol use: Not Currently    Marital status: .    Nursing Notes:   Malinda Lund  4/9/2024  1:34 PM  Signed  Chief Complaint   Patient presents with    New Patient     Hemorrhoids       Vitals:    04/09/24 1332   BP: 113/79   BP Location: Left arm   Patient Position: Sitting   Cuff Size: Adult Large   Pulse: 90   SpO2: 98%   Weight: 86.6 kg (191 lb)   Height: 1.575 m (5' 2\")       Body mass index is 34.93 kg/m .          "                 Malinda Lund EMT       15 minutes spent on the date of encounter performing chart review, history and exam, documentation and further activities as noted above       This note was created using speech recognition software and may contain unintended word substitutions.        Again, thank you for allowing me to participate in the care of your patient.      Sincerely,    MATTIE Purdy CNP

## 2024-04-09 NOTE — PROGRESS NOTES
"Colon and Rectal Surgery Consult Clinic Note    Date: 2024     Referring provider:  MATTIE Francisco CNP  [address] [address]  [place] [address]     RE: Adelita Conley  : 1990  MICHAELA: 2024    Adelita Conley is a very pleasant 33 year old female who is currently 17 weeks pregnant here for hemorrhoids.    HPI:  Had hemorrhoids with first pregnancy. This burst but never completely resolved between pregnancies. Still has a hemorrhoid present but this is not painful. Has some mild swelling. Is having constipation even with Metamucil. Having 1-2 hard stools a day. No bleeding. No procedures. Has extra skin externally.    Physical Examination:  /79 (BP Location: Left arm, Patient Position: Sitting, Cuff Size: Adult Large)   Pulse 90   Ht 5' 2\"   Wt 191 lb   LMP 2023 (Approximate)   SpO2 98%   BMI 34.93 kg/m    General: alert, oriented, in no acute distress, sitting comfortably  HEENT: mucous membranes moist    Assessment/Plan: 33 year old female who developed hemorrhoids with prior pregnancy and wants to try to prevent hemorrhoids with current pregnancy. She has constipation and advised starting Miralax daily in addition to her fiber supplement to keep stools soft. Hemorrhoids are not currently symptomatic. If she is bothered by external skin tags, would wait until after she is done having children to discuss surgical intervention for this. She can follow up as needed for any worsening symptoms, questions, or concerns. Patient's questions were answered to her stated satisfaction and she is in agreement with this plan.     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     Priority: Medium    History of blood transfusion 01/23/2024     Priority: Medium    Need for Tdap vaccination 01/23/2024     Priority: Medium    Pregnancy of unknown anatomic location 01/09/2024     Priority: Medium       Medications:  Current Outpatient Medications   Medication Sig Dispense Refill    Ascorbic Acid (VITAMIN C) 500 MG CAPS       cholecalciferol (VITAMIN D3) 25 mcg (1000 units) capsule Take 1 capsule by mouth daily      fish oil-omega-3 fatty acids 500 MG capsule       Prenatal Vit-DSS-Fe Cbn-FA (PRENATAL AD PO)       prochlorperazine (COMPAZINE) 10 MG tablet Take 1 tablet (10 mg) by mouth every 6 hours as needed for nausea or vomiting 10 tablet 0       Allergies:  Allergies   Allergen Reactions    Clindamycin Rash       Family history:  Family History   Problem Relation Age of Onset    No Known Problems Mother     IgA nephropathy Father     No Known Problems Brother     No Known Problems Sister     No Known Problems Son        Social history:  Social History     Tobacco Use    Smoking status: Never     Passive exposure: Never    Smokeless tobacco: Never   Substance Use Topics    Alcohol use: Not Currently    Marital status: .    Nursing Notes:   Malinda Lund  4/9/2024  1:34 PM  Signed  Chief Complaint   Patient presents with    New Patient     Hemorrhoids       Vitals:    04/09/24 1332   BP: 113/79   BP Location: Left arm   Patient Position: Sitting   Cuff Size: Adult Large   Pulse: 90   SpO2: 98%   Weight: 86.6 kg (191 lb)   Height: 1.575 m (5' 2\")       Body mass index is 34.93 kg/m .                          RAJIV Smith       15 minutes spent on the date of encounter performing chart review, history and exam, documentation and further activities as noted above     MATTIE Fowler, NP-C  Colon and Rectal Surgery   [facility]    This note was created using " speech recognition software and may contain unintended word substitutions.

## 2024-04-09 NOTE — NURSING NOTE
"Chief Complaint   Patient presents with    New Patient     Hemorrhoids       Vitals:    04/09/24 1332   BP: 113/79   BP Location: Left arm   Patient Position: Sitting   Cuff Size: Adult Large   Pulse: 90   SpO2: 98%   Weight: 86.6 kg (191 lb)   Height: 1.575 m (5' 2\")       Body mass index is 34.93 kg/m .                          Malinda Lund EMT    "

## 2024-04-10 ENCOUNTER — PRENATAL OFFICE VISIT (OUTPATIENT)
Dept: OBGYN | Facility: CLINIC | Age: 34
End: 2024-04-10
Payer: COMMERCIAL

## 2024-04-10 VITALS
OXYGEN SATURATION: 94 % | WEIGHT: 191.2 LBS | SYSTOLIC BLOOD PRESSURE: 122 MMHG | DIASTOLIC BLOOD PRESSURE: 71 MMHG | BODY MASS INDEX: 34.97 KG/M2 | HEART RATE: 96 BPM

## 2024-04-10 DIAGNOSIS — Z34.82 ENCOUNTER FOR SUPERVISION OF OTHER NORMAL PREGNANCY, SECOND TRIMESTER: Primary | ICD-10-CM

## 2024-04-10 PROCEDURE — 99212 OFFICE O/P EST SF 10 MIN: CPT | Performed by: OBSTETRICS & GYNECOLOGY

## 2024-04-10 PROCEDURE — 36415 COLL VENOUS BLD VENIPUNCTURE: CPT | Performed by: OBSTETRICS & GYNECOLOGY

## 2024-04-10 PROCEDURE — 82105 ALPHA-FETOPROTEIN SERUM: CPT | Mod: 90 | Performed by: OBSTETRICS & GYNECOLOGY

## 2024-04-10 PROCEDURE — 99000 SPECIMEN HANDLING OFFICE-LAB: CPT | Performed by: OBSTETRICS & GYNECOLOGY

## 2024-04-10 NOTE — PROGRESS NOTES
Return OB visit    Subjective:  Patient denies vaginal bleeding or leaking fluid. She denies contractions. She has no concerns today.        Objective:  /71   Pulse 96   Wt 86.7 kg (191 lb 3.2 oz)   LMP 2023 (Approximate)   SpO2 94%   BMI 34.97 kg/m     See OB flow sheet    Assessment and Plan    Adelita Conley is a 33 year old  at 17w1d here for SHON visit, pregnancy uncomplicated     This visit:  -AFP done today   -FAS ordered     Next visit:  -Routine PNC     RTC in 3-4 weeks or sooner BALJINDER Arce MD

## 2024-04-10 NOTE — PATIENT INSTRUCTIONS
"Weeks 14 to 18 of Your Pregnancy: Care Instructions  Around this time, you may start to look pregnant. Your baby is now able to pass urine. And the first stool (meconium) is starting to collect in your baby's intestines. Hair is starting to grow on your baby's head.    You may notice some skin changes, such as itchy spots on your palms or acne on your face.    At your next doctor visit, you may have an ultrasound. So you might think about whether you want to know the sex of your baby. Also ask your doctor about flu and COVID-19 shots.     How to reduce stress   Ask for help when you need it.  Try to avoid things that cause you stress.  Seek out things that relieve stress, such as breathing exercises or yoga.     How to get exercise   If you don't usually exercise, start slowly. Short walks may be a good choice.  Try to be active 30 minutes a day, at least 5 days a week.  Avoid activities where you're more likely to fall.  Use light weights to reduce stress on your joints.     How to stay at a healthy weight for you   Talk to your doctor or midwife about how much weight you should gain.  It's generally best to gain:  About 28 to 40 pounds if you're underweight.  About 25 to 35 pounds if you're at a healthy weight.  About 15 to 25 pounds if you're overweight.  About 11 to 20 pounds if you're very overweight (obese).  Follow-up care is a key part of your treatment and safety. Be sure to make and go to all appointments, and call your doctor if you are having problems. It's also a good idea to know your test results and keep a list of the medicines you take.  Where can you learn more?  Go to [email or web address].[facility] [email or web address]/patiented  Enter I453 in the search box to learn more about \"Weeks 14 to 18 of Your Pregnancy: Care Instructions.\"  Current as of: July 10, 2023               Content Version: 14.0    2387-3484 [employer].   Care instructions adapted under license by your healthcare professional. If you have " questions about a medical condition or this instruction, always ask your healthcare professional. Healthwise, [facility] disclaims any warranty or liability for your use of this information.      Second-Trimester Fetal Ultrasound: About This Test  What is it?     Fetal ultrasound is a test that uses sound waves to make pictures of your baby (fetus) and placenta inside the uterus. The test is the safest way to find out the age, size, and position of your baby. You also may be able to find out the sex of your baby. (But the test isn't done just to find out a baby's sex.)  No known risks to the mother or the baby are linked to fetal ultrasound. But you may feel anxious if the test reveals a problem with your pregnancy or baby.  Why is this test done?  In the second trimester, a fetal ultrasound is done to:  Estimate the number of weeks and days a fetus has developed since the beginning of the pregnancy. This is called the gestational age.  Look at the size and position of the fetus, the placenta, and the fluid that surrounds the fetus.  Find major birth defects, such as heart problems or problems with the brain and spinal cord (neural tube defects). But the test may not be able to find many minor defects and some major birth defects.  How do you prepare for the test?  In general, there's nothing you have to do before this test, unless your doctor tells you to.  How is the test done?  You may be able to leave your clothes on, or you will be given a gown to wear.  You will lie on your back on a padded examination table.  A gel will be spread on your belly. It will be removed after the test.  A small, handheld device called a transducer will be pressed against the gel on your skin and moved across your belly several times.  You may watch the monitor to see the picture of your baby during the test.  What happens after the test?  You will probably be able to go home right away.  You most likely will be able to go back to  "your usual activities right away.  Follow-up care is a key part of your treatment and safety. Be sure to make and go to all appointments, and call your doctor if you are having problems. It's also a good idea to keep a list of the medicines you take. Ask your doctor when you can expect to have your test results.  Where can you learn more?  Go to https://[email or web address]/patiented  Enter Y671 in the search box to learn more about \"Second-Trimester Fetal Ultrasound: About This Test.\"  Current as of: July 10, 2023               Content Version: 14.0    9643-6922 [employer].   Care instructions adapted under license by your healthcare professional. If you have questions about a medical condition or this instruction, always ask your healthcare professional. [employer] disclaims any warranty or liability for your use of this information.      During Pregnancy: Exercises  Introduction  Here are some examples of exercises to do during your pregnancy. Start each exercise slowly. Ease off the exercise if you start to have pain.  Talk to your doctor about when you can start these exercises and which ones will work best for you.  How to do the exercises  Neck rotation    Sit up straight in a firm chair, or stand up straight. If you're standing, keep your feet about hip-width apart.  Keeping your chin level, turn your head to the right and hold for 15 to 30 seconds.  Turn your head to the left and hold for 15 to 30 seconds.  Repeat 2 to 4 times.  Neck stretch to the front    Sit up straight in a firm chair, or stand up straight. Look straight ahead. If you're standing, keep your feet about hip-width apart.  Slowly bend your head forward without moving your shoulders.  Hold for 15 to 30 seconds, then return to your starting position.  Repeat 2 to 4 times.  Back press    Stand with your back 10 to 12 inches away from a wall.  Lean into the wall until your back is against it. Press your lower back " against the wall by pulling in your stomach muscles.  Slowly slide down until your knees are slightly bent, pressing your lower back against the wall.  Hold for at least 6 seconds, then slide back up the wall.  Repeat 8 to 12 times.  Over time, work up to holding this position for as much as 1 minute.  Trunk twist    Sit on the floor with your legs crossed. If that's not comfortable, you can sit on a folded blanket so your bottom is a few inches off the floor. Or you can sit on a chair with your knees hip-width apart and your feet flat on the floor.  Reach your left hand toward your right knee. You can place your right hand at your side for support.  Slowly twist your body (trunk) to your right.  Relax and return to your starting position.  Repeat 2 to 4 times.  Switch your hands and twist to your left.  Repeat 2 to 4 times.  Pelvic rocking on hands and knees    Start on your hands and knees. Place your wrists directly below your shoulders and your knees below your hips.  Breathe in slowly. Tuck your head downward and round your back up, making a curve with your back in the shape of the letter C. Hold this position for about 6 seconds.  Breathe out slowly and bring your head back up. Relax, keeping your back straight. (Don't allow it to curve toward the floor.) Hold for about 6 seconds.  Repeat 8 to 12 times, gently rocking your pelvis.  Pelvic tilt    Lie on your back with your knees bent and your feet flat on the floor.  Tighten your belly muscles by pulling your belly button in toward your spine. Press your lower back to the floor. You should feel your hips and pelvis rock back.  Hold for 6 seconds while breathing smoothly, and then relax.  Repeat 8 to 12 times.  Do this exercise only during the first 4 months of pregnancy. After this point, lying on your back is not recommended, because it can cause blood flow problems for you and your baby.  Backward stretch    Start on your hands and knees with your knees 8 to  10 inches apart, hands directly below your shoulders, and arms and back straight.  Keeping your arms straight, slowly lower your buttocks toward your heels and tuck your head toward your knees. Hold for 15 to 30 seconds.  Slowly return to the starting position.  Repeat 2 to 4 times.  Forward bend    Sit comfortably in a chair, with your arms relaxed.  Slowly bend forward, allowing your arms to hang down. Lean only as far as you can without feeling discomfort or pressure on your belly.  Hold for 15 to 30 seconds and then slowly sit up straight.  Repeat 2 to 4 times or to your comfort level.  Donkey kick    Start on your hands and knees. Place your hands directly below your shoulders, and keep your arms straight.  Tighten your belly muscles by pulling your belly button in toward your spine. Keep breathing normally, and don't hold your breath.  Lift one knee and bring it toward your elbow.  Slowly extend that leg behind you without completely straightening it. Be careful not to let your hip drop down. Avoid arching your back.  Hold your leg behind you for about 6 seconds.  Return to your starting position.  Repeat 8 to 12 times for each leg.  Tailor sitting    Sit on the floor.  Bring your feet close to your body while crossing your ankles.  Keep your back straight. Relax your legs and let your knees drop toward the floor.  Hold this position for as long as you are comfortable.  Toe reach    Sit on the floor with your back straight, legs about 12 inches apart, and feet relaxed outward.  Stretch your hands forward toward your right foot, then sit up.  Stretch your hands straight forward, then sit up.  Stretch your hands forward toward your left foot, then sit up.  Hold each stretch for 15 to 30 seconds.  Repeat 2 to 4 times.  Follow-up care is a key part of your treatment and safety. Be sure to make and go to all appointments, and call your doctor if you are having problems. It's also a good idea to know your test  results and keep a list of the medicines you take.  Current as of: July 10, 2023               Content Version: 14.0    3628-4347 [employer].   Care instructions adapted under license by your healthcare professional. If you have questions about a medical condition or this instruction, always ask your healthcare professional. [employer] disclaims any warranty or liability for your use of this information.

## 2024-04-19 LAB
# FETUSES US: NORMAL
AFP MOM SERPL: 1
AFP SERPL-MCNC: 34 NG/ML
AGE - REPORTED: 33.4 YR
CURRENT SMOKER: NO
FAMILY MEMBER DISEASES HX: NO
GA METHOD: NORMAL
GA: NORMAL WK
IDDM PATIENT QL: NO
INTEGRATED SCN PATIENT-IMP: NORMAL
SPECIMEN DRAWN SERPL: NORMAL

## 2024-05-14 ENCOUNTER — ANCILLARY PROCEDURE (OUTPATIENT)
Dept: ULTRASOUND IMAGING | Facility: CLINIC | Age: 34
End: 2024-05-14
Attending: OBSTETRICS & GYNECOLOGY
Payer: COMMERCIAL

## 2024-05-14 ENCOUNTER — PRENATAL OFFICE VISIT (OUTPATIENT)
Dept: OBGYN | Facility: CLINIC | Age: 34
End: 2024-05-14
Attending: OBSTETRICS & GYNECOLOGY
Payer: COMMERCIAL

## 2024-05-14 VITALS
SYSTOLIC BLOOD PRESSURE: 115 MMHG | DIASTOLIC BLOOD PRESSURE: 69 MMHG | WEIGHT: 195 LBS | OXYGEN SATURATION: 100 % | BODY MASS INDEX: 35.67 KG/M2 | HEART RATE: 104 BPM

## 2024-05-14 DIAGNOSIS — Z34.82 ENCOUNTER FOR SUPERVISION OF OTHER NORMAL PREGNANCY, SECOND TRIMESTER: ICD-10-CM

## 2024-05-14 DIAGNOSIS — Z34.82 ENCOUNTER FOR SUPERVISION OF OTHER NORMAL PREGNANCY, SECOND TRIMESTER: Primary | ICD-10-CM

## 2024-05-14 DIAGNOSIS — O35.BXX0 ECHOGENIC FOCUS OF HEART OF FETUS AFFECTING ANTEPARTUM CARE OF MOTHER, SINGLE OR UNSPECIFIED FETUS: ICD-10-CM

## 2024-05-14 PROCEDURE — 76805 OB US >/= 14 WKS SNGL FETUS: CPT | Performed by: OBSTETRICS & GYNECOLOGY

## 2024-05-14 PROCEDURE — 99212 OFFICE O/P EST SF 10 MIN: CPT | Performed by: OBSTETRICS & GYNECOLOGY

## 2024-05-14 NOTE — PROGRESS NOTES
22w0d  US done today at [facility].  No formal report yet.  On quick review of images, noted left ventricular EIF.  She did have normal NT and NIPT, so very low risk, but discussed MFM for Level 2 nonetheless.  Referral ordered.  Did have episode of heavier discharge, almost like mucus plug, before recent trip to [place], but nothing since then.  Given reassurance.  RTC 4w with GCT.  Raya Gomez MD

## 2024-05-15 ENCOUNTER — TRANSCRIBE ORDERS (OUTPATIENT)
Dept: MATERNAL FETAL MEDICINE | Facility: CLINIC | Age: 34
End: 2024-05-15
Payer: COMMERCIAL

## 2024-05-15 DIAGNOSIS — O26.90 PREGNANCY RELATED CONDITION, ANTEPARTUM: Primary | ICD-10-CM

## 2024-05-22 ENCOUNTER — HOSPITAL ENCOUNTER (OUTPATIENT)
Dept: ULTRASOUND IMAGING | Facility: CLINIC | Age: 34
Discharge: HOME | End: 2024-05-22
Attending: OBSTETRICS & GYNECOLOGY
Payer: COMMERCIAL

## 2024-05-22 ENCOUNTER — OFFICE VISIT (OUTPATIENT)
Dept: MATERNAL FETAL MEDICINE | Facility: CLINIC | Age: 34
End: 2024-05-22
Attending: OBSTETRICS & GYNECOLOGY
Payer: COMMERCIAL

## 2024-05-22 DIAGNOSIS — O35.BXX0 ECHOGENIC FOCUS OF HEART OF FETUS AFFECTING ANTEPARTUM CARE OF MOTHER, SINGLE OR UNSPECIFIED FETUS: Primary | ICD-10-CM

## 2024-05-22 DIAGNOSIS — O26.90 PREGNANCY RELATED CONDITION, ANTEPARTUM: ICD-10-CM

## 2024-05-22 PROCEDURE — 76811 OB US DETAILED SNGL FETUS: CPT

## 2024-05-22 PROCEDURE — 76811 OB US DETAILED SNGL FETUS: CPT | Mod: 26 | Performed by: OBSTETRICS & GYNECOLOGY

## 2024-05-22 NOTE — NURSING NOTE
Patient reports positive fetal movement, no pain, no contractions, leaking of fluid, or bleeding.   SBAR given to DYLON DRIVER, see their note in Epic.

## 2024-05-22 NOTE — PROGRESS NOTES
"Please see \"Imaging\" tab under \"Chart Review\" for details of today's US at the [facility].    Lavell Cantor MD  Maternal-Fetal Medicine    "

## 2024-05-29 ENCOUNTER — PRENATAL OFFICE VISIT (OUTPATIENT)
Dept: OBGYN | Facility: CLINIC | Age: 34
End: 2024-05-29
Payer: COMMERCIAL

## 2024-05-29 VITALS
WEIGHT: 201.4 LBS | OXYGEN SATURATION: 96 % | SYSTOLIC BLOOD PRESSURE: 115 MMHG | HEART RATE: 93 BPM | TEMPERATURE: 98.1 F | BODY MASS INDEX: 36.84 KG/M2 | DIASTOLIC BLOOD PRESSURE: 69 MMHG

## 2024-05-29 DIAGNOSIS — Z34.82 ENCOUNTER FOR SUPERVISION OF OTHER NORMAL PREGNANCY, SECOND TRIMESTER: Primary | ICD-10-CM

## 2024-05-29 LAB
GLUCOSE 1H P 50 G GLC PO SERPL-MCNC: 124 MG/DL (ref 70–129)
HGB BLD-MCNC: 11 G/DL (ref 11.7–15.7)
HOLD SPECIMEN: NORMAL

## 2024-05-29 PROCEDURE — 99000 SPECIMEN HANDLING OFFICE-LAB: CPT | Performed by: OBSTETRICS & GYNECOLOGY

## 2024-05-29 PROCEDURE — 36415 COLL VENOUS BLD VENIPUNCTURE: CPT | Performed by: OBSTETRICS & GYNECOLOGY

## 2024-05-29 PROCEDURE — 82105 ALPHA-FETOPROTEIN SERUM: CPT | Mod: 90 | Performed by: OBSTETRICS & GYNECOLOGY

## 2024-05-29 PROCEDURE — 82950 GLUCOSE TEST: CPT | Performed by: OBSTETRICS & GYNECOLOGY

## 2024-05-29 PROCEDURE — 99212 OFFICE O/P EST SF 10 MIN: CPT | Performed by: OBSTETRICS & GYNECOLOGY

## 2024-05-29 ASSESSMENT — ANXIETY QUESTIONNAIRES
GAD7 TOTAL SCORE: 0
6. BECOMING EASILY ANNOYED OR IRRITABLE: NOT AT ALL
2. NOT BEING ABLE TO STOP OR CONTROL WORRYING: NOT AT ALL
IF YOU CHECKED OFF ANY PROBLEMS ON THIS QUESTIONNAIRE, HOW DIFFICULT HAVE THESE PROBLEMS MADE IT FOR YOU TO DO YOUR WORK, TAKE CARE OF THINGS AT HOME, OR GET ALONG WITH OTHER PEOPLE: NOT DIFFICULT AT ALL
3. WORRYING TOO MUCH ABOUT DIFFERENT THINGS: NOT AT ALL
7. FEELING AFRAID AS IF SOMETHING AWFUL MIGHT HAPPEN: NOT AT ALL
1. FEELING NERVOUS, ANXIOUS, OR ON EDGE: NOT AT ALL
GAD7 TOTAL SCORE: 0
5. BEING SO RESTLESS THAT IT IS HARD TO SIT STILL: NOT AT ALL

## 2024-05-29 ASSESSMENT — PATIENT HEALTH QUESTIONNAIRE - PHQ9
SUM OF ALL RESPONSES TO PHQ QUESTIONS 1-9: 1
5. POOR APPETITE OR OVEREATING: NOT AT ALL

## 2024-05-29 NOTE — PATIENT INSTRUCTIONS
"Weeks 22 to 26 of Your Pregnancy: Care Instructions  Your baby's lungs are getting ready for breathing. Your baby may respond to your voice. Your baby likely turns less, and kicks or jerks more. Jerking may mean that your baby has hiccups.    Think about taking childbirth classes. And start to think about whether you want to have pain medicine during labor.    At your next doctor visit, you may be tested for anemia and for high blood sugar that first occurs during pregnancy (gestational diabetes). These conditions can cause problems for you and your baby.    To ease discomfort, such as back pain    Change your position often. Try not to sit or stand for too long.  Get some exercise. Things like walking or stretching may help.  Try using a heating pad or cold pack.    To ease or reduce swelling in your feet, ankles, hands, and fingers    Take off your rings.  Avoid high-sodium foods, such as potato chips.  Prop up your feet, and sleep with pillows under your feet.  Try to avoid standing for long periods of time.  Do not wear tight shoes.  Wear support stockings.  Kegel exercises to prevent urine from leaking    Squeeze your muscles as if you were trying not to pass gas. Your belly, legs, and buttocks shouldn't move. Hold the squeeze for 3 seconds, then relax for 5 to 10 seconds.    Add 1 second each week until you can squeeze for 10 seconds. Repeat the exercise 10 times a session. Do 3 to 8 sessions a day. If these exercises cause you pain, stop doing them and talk with your doctor.  Follow-up care is a key part of your treatment and safety. Be sure to make and go to all appointments, and call your doctor if you are having problems. It's also a good idea to know your test results and keep a list of the medicines you take.  Where can you learn more?  Go to https://www.healthwise.net/patiented  Enter G264 in the search box to learn more about \"Weeks 22 to 26 of Your Pregnancy: Care Instructions.\"  Current as of: July " 10, 2023               Content Version: 14.0    9375-0520 [employer].   Care instructions adapted under license by your healthcare professional. If you have questions about a medical condition or this instruction, always ask your healthcare professional. [employer] disclaims any warranty or liability for your use of this information.      Learning About Screening for Gestational Diabetes  What is gestational diabetes screening?     Screening for gestational diabetes is a way to look for high blood sugar during pregnancy. You drink some very sweet liquid. Then you have a blood test to see how your body uses sugar (glucose).  How is gestational diabetes screening done?  Screening for gestational diabetes may be done in a couple of ways.  Two-part screening.  Part one (glucose challenge test): A blood sample is taken after you drink a liquid that contains sugar (glucose). You don't need to stop eating or drinking before this test. If the test shows that you don't have a lot of sugar in your blood, you don't have gestational diabetes.  Part two (oral glucose tolerance test, or OGTT): If the first test shows a lot of sugar in your blood, then you may have an OGTT. You can't eat or drink for at least 8 hours before this test. A blood sample is taken, then you drink a sweet liquid. You have more blood tests after 1 to 3 hours. If the OGTT shows that you have a lot of sugar in your blood, you may have gestational diabetes.  One-part screening.  Sometimes doctors use the OGTT on its own. If the test shows that you don't have a lot of sugar in your blood, you don't have gestational diabetes. If you do have a lot of sugar in your blood, you may have the condition.  What are the risks of screening?  Your blood glucose level may drop very low toward the end of the test. If this happens, you may feel weak, hungry, and restless. Tell your doctor if you have these symptoms. The test usually will be  "stopped.  You may vomit after drinking the sweet liquid. If this happens, you may need to take the test at a later time.  Your doctor may do more glucose tests at other times during your pregnancy.  Follow-up care is a key part of your treatment and safety. Be sure to make and go to all appointments, and call your doctor if you are having problems. It's also a good idea to know your test results and keep a list of the medicines you take.  Where can you learn more?  Go to https://www.[email or web address].net/patiented  Enter A472 in the search box to learn more about \"Learning About Screening for Gestational Diabetes.\"  Current as of: 2023               Content Version: 14.0    8187-6175 [employer].   Care instructions adapted under license by your healthcare professional. If you have questions about a medical condition or this instruction, always ask your healthcare professional. [employer] disclaims any warranty or liability for your use of this information.      You have been provided the My Labor and Birth Wishes document.  Please review at home and bring to your next prenatal visit. Bring this sheet to the hospital for your birth. Give copies to your care team members and support person.   Additional copies can be found here:  [email or web address]  Weeks 26 to 30 of Your Pregnancy: Care Instructions  You're starting your last trimester. You'll probably feel your baby moving around more. Your back may ache as your body gets used to your baby's size and length. Take care of yourself, and pay attention to what your body needs.    Talk to your doctor about getting the Tdap shot. It will help protect your  against whooping cough (pertussis). Also ask your doctor about flu and COVID-19 shots if you haven't had them yet. If your blood type is Rh negative, you may be given a shot of Rh immune globulin (such as RhoGAM). It can help prevent problems for your baby.    You may have " "Logan-Grace contractions. They are single or several strong contractions without a pattern. These are practice contractions but not the start of labor.  Be kind to yourself.     Take breaks when you're tired.  Change positions often. Don't sit for too long or stand for too long.  At work, rest during breaks if you can. If you don't get breaks, talk to your doctor about writing a letter to your employer to request them.  Avoid fumes, chemicals, and tobacco smoke.  Be sexual if you want to.     You may be interested in sex, or you may not. Everyone is different.  Sex is okay unless your doctor tells you not to.  Your belly can make it hard to find good positions for sex. [place] and explore.  Watch for signs of  labor.    These signs include:   Menstrual-like cramps. Or you may have pain or pressure in your pelvis that happens in a pattern.  About 6 or more contractions in an hour (even after rest and a glass of water).  A low, dull backache that doesn't go away when you change positions.  An increase or change in vaginal discharge.  Light vaginal bleeding or spotting.  Your water breaking.  Know what to do if you think you are having contractions.     Drink 1 or 2 glasses of water.  Lie down on your left side for at least an hour.  While on your side, feel the top of your belly to see if it's tight.  Write down your contractions for an hour. Time how long it is from the start of one contraction to the start of the next.  Call your doctor if you have regular contractions.  Follow-up care is a key part of your treatment and safety. Be sure to make and go to all appointments, and call your doctor if you are having problems. It's also a good idea to know your test results and keep a list of the medicines you take.  Where can you learn more?  Go to https://www.healthwise.net/patiented  Enter S999 in the search box to learn more about \"Weeks 26 to 30 of Your Pregnancy: Care Instructions.\"  Current as of: July " 10, 2023               Content Version: 14.0    7568-4171 [employer].   Care instructions adapted under license by your healthcare professional. If you have questions about a medical condition or this instruction, always ask your healthcare professional. [employer] disclaims any warranty or liability for your use of this information.      Counting Your Baby's Kicks: Care Instructions  Overview     Counting your baby's kicks is one way your doctor can tell that your baby is healthy. You will probably feel your baby move for the first time between 16 and 22 weeks. The movement may feel like flutters rather than kicks. Your baby may move more at certain times of the day. When you are active, you may notice less kicking than when you are resting. At your prenatal visits, your doctor will ask whether the baby is active.  In your last trimester, your doctor may ask you to count the number of times you feel your baby move.  Follow-up care is a key part of your treatment and safety. Be sure to make and go to all appointments, and call your doctor if you are having problems. It's also a good idea to know your test results and keep a list of the medicines you take.  How do you count fetal kicks?  A common method of checking your baby's movement is to note the length of time it takes to count 10 movements (such as kicks, flutters, or rolls).  Pick your baby's most active time of day to count. This may be any time from morning to evening.  If you don't feel 10 movements in an hour, have something to eat or drink and count for another hour. If you don't feel at least 10 movements in the 2-hour period, call your doctor.  Do not use an at-home Doppler heart monitor in place of counting fetal movements.  When should you call for help?   Call your doctor now or seek immediate medical care if:    You feel fewer than 10 movements in a 2-hour period.     You noticed that your baby has stopped moving or is  "moving less than normal.   Watch closely for changes in your health, and be sure to contact your doctor if you have any problems.  Where can you learn more?  Go to [email or web address].net/[email or web address]  Enter U048 in the search box to learn more about \"Counting Your Baby's Kicks: Care Instructions.\"  Current as of: July 10, 2023               Content Version: 14.0    1877-2955 [employer]   Care instructions adapted under license by your healthcare professional. If you have questions about a medical condition or this instruction, always ask your healthcare professional. [employer] disclaims any warranty or liability for your use of this information.        "

## 2024-05-29 NOTE — PROGRESS NOTES
24w1d  Feeling well.  Baby is active.    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   discussed signs/symptoms concerning for PTL.   Reviewed weight gain.   Delivered her first at the [facility] baby center in [facility].  Her insurance maybe changing so depending on her insurance, may need to switch clinics.    GCT and hgb today. RR

## 2024-05-31 LAB
# FETUSES US: NORMAL
AFP MOM SERPL: 1.4
AFP SERPL-MCNC: 140 NG/ML
AGE - REPORTED: 33.4 YR
CURRENT SMOKER: NO
FAMILY MEMBER DISEASES HX: NO
GA METHOD: NORMAL
GA: NORMAL WK
IDDM PATIENT QL: NO
INTEGRATED SCN PATIENT-IMP: NORMAL
SPECIMEN DRAWN SERPL: NORMAL

## 2025-02-15 ENCOUNTER — HEALTH MAINTENANCE LETTER (OUTPATIENT)
Age: 35
End: 2025-02-15

## 2025-05-15 ENCOUNTER — LAB (OUTPATIENT)
Dept: LAB | Facility: CLINIC | Age: 35
End: 2025-05-15
Payer: COMMERCIAL

## 2025-05-15 DIAGNOSIS — Z01.84 IMMUNITY STATUS TESTING: Primary | ICD-10-CM

## 2025-05-15 LAB — HBV SURFACE AG SERPL QL IA: NONREACTIVE

## 2025-05-15 PROCEDURE — 87340 HEPATITIS B SURFACE AG IA: CPT

## 2025-05-15 PROCEDURE — 86481 TB AG RESPONSE T-CELL SUSP: CPT

## 2025-05-17 LAB
GAMMA INTERFERON BACKGROUND BLD IA-ACNC: 0.03 IU/ML
M TB IFN-G BLD-IMP: NEGATIVE
M TB IFN-G CD4+ BCKGRND COR BLD-ACNC: 9.97 IU/ML
MITOGEN IGNF BCKGRD COR BLD-ACNC: 0.05 IU/ML
MITOGEN IGNF BCKGRD COR BLD-ACNC: 0.06 IU/ML
QUANTIFERON MITOGEN: 10 IU/ML
QUANTIFERON NIL TUBE: 0.03 IU/ML
QUANTIFERON TB1 TUBE: 0.08 IU/ML
QUANTIFERON TB2 TUBE: 0.09

## 2025-05-19 DIAGNOSIS — Z01.84 IMMUNITY STATUS TESTING: Primary | ICD-10-CM
